# Patient Record
Sex: MALE | Race: ASIAN | Employment: OTHER | URBAN - METROPOLITAN AREA
[De-identification: names, ages, dates, MRNs, and addresses within clinical notes are randomized per-mention and may not be internally consistent; named-entity substitution may affect disease eponyms.]

---

## 2017-11-14 ENCOUNTER — HOSPITAL ENCOUNTER (OUTPATIENT)
Dept: NUCLEAR MEDICINE | Age: 77
Discharge: HOME | End: 2017-11-14
Payer: MEDICARE

## 2017-11-14 VITALS — BODY MASS INDEX: 29.18 KG/M2 | WEIGHT: 170 LBS

## 2017-11-14 DIAGNOSIS — R07.2 PRECORDIAL CHEST PAIN: ICD-10-CM

## 2017-11-14 PROCEDURE — 93017 CV STRESS TEST TRACING ONLY: CPT

## 2017-11-14 PROCEDURE — 74011250636 HC RX REV CODE- 250/636

## 2017-11-14 PROCEDURE — 78452 HT MUSCLE IMAGE SPECT MULT: CPT

## 2017-11-14 RX ORDER — SODIUM CHLORIDE 0.9 % (FLUSH) 0.9 %
10 SYRINGE (ML) INJECTION
Status: COMPLETED | OUTPATIENT
Start: 2017-11-14 | End: 2017-11-14

## 2017-11-14 RX ADMIN — Medication 10 ML: at 12:07

## 2017-11-14 RX ADMIN — REGADENOSON 0.4 MG: 0.08 INJECTION, SOLUTION INTRAVENOUS at 12:07

## 2017-11-14 RX ADMIN — Medication 10 ML: at 11:16

## 2017-11-15 NOTE — PROCEDURES
[address] STRESS TEST       Name:  Bryce Lion   MR#:  [id number]   :  1940   Account #:  [de-identified]   Date of Adm:  2017       DATE OF PROCEDURE:  2017    Rest stress myocardial perfusion scan    CLINICAL: Coronary disease, previous CABG, chest pain, shortness   of breath, 78year-old gentleman. Nuclear medicine stress test was performed. This was a [place]   study. The patient came to the echo suite fasting. Rest heart rate   63, blood pressure 156/75. The [place] was administered 400 mcg   and the peak heart rate generated 85, blood pressure was 111/56,   peak 148/79 with a stress infusion. The electrocardiogram   revealed a sinus rhythm, an old anterior infarct, nonspecific   ST-T wave changes and there was no change with the Lexiscan   infusion. No symptoms were reported. Imaging was required with   tetrofosmin 99 and Myoview 30 mCi for stress, 10 mCi for rest.     The left ventricle was imaged in short axis vertical wall,   horizontal long axis views. INTERPRETATION: There was a moderate anterior apical defect that   was primarily fixed and with some slight cherrie infarct   reperfusion. There were no significant reversible defects noted. Wall motion revealed anterior apical hypokinesis with a calculated   left ventricular ejection fraction at 46%. IMPRESSION:    1. 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 2. Mild reduction of systolic function, ejection fraction 46%.          MD Herb [place] / [place]   D:  11/15/2017   09:04   T:  11/15/2017   10:18   Job #:  [id number]

## 2018-06-28 ENCOUNTER — HOSPITAL ENCOUNTER (OUTPATIENT)
Dept: ULTRASOUND IMAGING | Age: 78
Discharge: HOME | End: 2018-06-28
Attending: INTERNAL MEDICINE
Payer: MEDICARE

## 2018-06-28 DIAGNOSIS — N18.30 CHRONIC KIDNEY DISEASE, STAGE III (MODERATE) (HCC): ICD-10-CM

## 2018-06-28 PROCEDURE — 76770 US EXAM ABDO BACK WALL COMP: CPT

## 2024-02-13 ENCOUNTER — INITIAL CONSULT (OUTPATIENT)
Age: 84
End: 2024-02-13

## 2024-02-13 VITALS — HEART RATE: 55 BPM | SYSTOLIC BLOOD PRESSURE: 102 MMHG | DIASTOLIC BLOOD PRESSURE: 58 MMHG | WEIGHT: 198.4 LBS

## 2024-02-13 DIAGNOSIS — I50.9 CONGESTIVE HEART FAILURE, UNSPECIFIED HF CHRONICITY, UNSPECIFIED HEART FAILURE TYPE (HCC): Primary | ICD-10-CM

## 2024-02-13 RX ORDER — ATORVASTATIN CALCIUM 80 MG/1
80 TABLET, FILM COATED ORAL DAILY
COMMUNITY

## 2024-02-13 RX ORDER — SACUBITRIL AND VALSARTAN 49; 51 MG/1; MG/1
1 TABLET, FILM COATED ORAL 2 TIMES DAILY
COMMUNITY

## 2024-02-13 RX ORDER — IRON, SODIUM ASCORBATE, THIAMINE MONONITRATE, RIBOFLAVIN, PYRIDOXINE HYDROCHLORIDE, FOLIC ACID, CYANOCOBALAMIN, NIACINAMIDE, CALCIUM PANTOTHENATE, ZINC SULFATE, MAGNESIUM SULFATE, MANGANESE SULFATE, AND CUPRIC SULFATE ANHYDROUS 106; 200; 10; 6; 5; 1; 15; 30; 10; 18.2; 6.9; 1.3; .8 MG/1; MG/1; MG/1; MG/1; MG/1; MG/1; UG/1; MG/1; MG/1; MG/1; MG/1; MG/1; MG/1
CAPSULE ORAL
COMMUNITY

## 2024-02-13 RX ORDER — NITROGLYCERIN 0.3 MG/1
0.3 TABLET SUBLINGUAL EVERY 5 MIN PRN
COMMUNITY

## 2024-02-13 RX ORDER — CYCLOSPORINE 0 G/ML
SOLUTION/ DROPS OPHTHALMIC; TOPICAL
COMMUNITY

## 2024-02-13 RX ORDER — SODIUM BICARBONATE 650 MG/1
650 TABLET ORAL 4 TIMES DAILY
COMMUNITY

## 2024-02-13 RX ORDER — CLOPIDOGREL BISULFATE 75 MG/1
75 TABLET ORAL DAILY
COMMUNITY

## 2024-02-13 RX ORDER — VALSARTAN 160 MG/1
160 TABLET ORAL DAILY
COMMUNITY

## 2024-02-13 RX ORDER — TERAZOSIN 5 MG/1
5 CAPSULE ORAL NIGHTLY
COMMUNITY

## 2024-02-13 RX ORDER — LEVOTHYROXINE SODIUM 88 UG/1
88 TABLET ORAL DAILY
COMMUNITY

## 2024-02-22 ASSESSMENT — ENCOUNTER SYMPTOMS
SHORTNESS OF BREATH: 0
ABDOMINAL PAIN: 0
ORTHOPNEA: 0

## 2024-02-22 NOTE — PROGRESS NOTES
furosemide (LASIX) 40 MG tablet Take 1 tablet by mouth 11/10/17  Yes Automatic Reconciliation, Ar   insulin aspart (NOVOLOG) 100 UNIT/ML injection pen Inject 22 Units into the skin   Yes Automatic Reconciliation, Ar   insulin glargine (LANTUS) 100 UNIT/ML injection vial Inject 60 Units into the skin   Yes Automatic Reconciliation, Ar   montelukast (SINGULAIR) 10 MG tablet Take 1 tablet by mouth 2/13/18  Yes Automatic Reconciliation, Ar   tamsulosin (FLOMAX) 0.4 MG capsule Take 1 capsule by mouth 3/8/18  Yes Automatic Reconciliation, Ar     Not on File  No past medical history on file.  No past surgical history on file.  No family history on file.  Social History     Tobacco Use    Smoking status: Not on file    Smokeless tobacco: Not on file   Substance Use Topics    Alcohol use: Not on file       ROS:    Review of Systems   Constitutional: Negative for chills, diaphoresis and fever.   HENT:  Negative for hearing loss.    Eyes:  Negative for visual disturbance.   Cardiovascular:  Negative for chest pain, claudication, dyspnea on exertion, leg swelling, near-syncope, orthopnea, palpitations and paroxysmal nocturnal dyspnea.   Respiratory:  Negative for shortness of breath.    Hematologic/Lymphatic: Does not bruise/bleed easily.   Gastrointestinal:  Negative for abdominal pain.   Genitourinary:  Negative for dysuria.   Neurological:  Negative for focal weakness.   Psychiatric/Behavioral:  Negative for suicidal ideas.           PHYSICAL EXAM:   BP (!) 102/58   Pulse 55   Wt 90 kg (198 lb 6.4 oz)      Physical Exam  Vitals reviewed.   Constitutional:       General: He is not in acute distress.     Appearance: Normal appearance.   HENT:      Head: Normocephalic and atraumatic.   Eyes:      General: No scleral icterus.  Neck:      Vascular: No carotid bruit.   Cardiovascular:      Rate and Rhythm: Normal rate and regular rhythm.      Heart sounds: No murmur heard.  Pulmonary:      Breath sounds: Normal breath sounds.

## 2024-03-15 ENCOUNTER — HOSPITAL ENCOUNTER (INPATIENT)
Age: 84
LOS: 4 days | Discharge: HOME HEALTH | DRG: 291 | End: 2024-03-20
Attending: GENERAL PRACTICE | Admitting: INTERNAL MEDICINE
Payer: MEDICARE

## 2024-03-15 ENCOUNTER — APPOINTMENT (OUTPATIENT)
Dept: GENERAL RADIOLOGY | Age: 84
DRG: 291 | End: 2024-03-15
Payer: MEDICARE

## 2024-03-15 DIAGNOSIS — R06.03 RESPIRATORY DISTRESS: ICD-10-CM

## 2024-03-15 DIAGNOSIS — R09.02 HYPOXIA: ICD-10-CM

## 2024-03-15 DIAGNOSIS — I50.9 ACUTE CONGESTIVE HEART FAILURE, UNSPECIFIED HEART FAILURE TYPE (HCC): Primary | ICD-10-CM

## 2024-03-15 LAB
ALBUMIN SERPL-MCNC: 3.3 G/DL (ref 3.2–4.6)
ALBUMIN/GLOB SERPL: 0.8 (ref 0.4–1.6)
ALP SERPL-CCNC: 84 U/L (ref 50–136)
ALT SERPL-CCNC: 42 U/L (ref 12–65)
ANION GAP SERPL CALC-SCNC: 4 MMOL/L (ref 2–11)
AST SERPL-CCNC: 26 U/L (ref 15–37)
BASOPHILS # BLD: 0.1 K/UL (ref 0–0.2)
BASOPHILS NFR BLD: 0 % (ref 0–2)
BILIRUB SERPL-MCNC: 0.6 MG/DL (ref 0.2–1.1)
BUN SERPL-MCNC: 35 MG/DL (ref 8–23)
CALCIUM SERPL-MCNC: 8.6 MG/DL (ref 8.3–10.4)
CHLORIDE SERPL-SCNC: 120 MMOL/L (ref 103–113)
CO2 SERPL-SCNC: 22 MMOL/L (ref 21–32)
CREAT SERPL-MCNC: 2.2 MG/DL (ref 0.8–1.5)
DIFFERENTIAL METHOD BLD: ABNORMAL
EOSINOPHIL # BLD: 0.1 K/UL (ref 0–0.8)
EOSINOPHIL NFR BLD: 1 % (ref 0.5–7.8)
ERYTHROCYTE [DISTWIDTH] IN BLOOD BY AUTOMATED COUNT: 14.3 % (ref 11.9–14.6)
GLOBULIN SER CALC-MCNC: 3.9 G/DL (ref 2.8–4.5)
GLUCOSE SERPL-MCNC: 157 MG/DL (ref 65–100)
HCT VFR BLD AUTO: 36.4 % (ref 41.1–50.3)
HGB BLD-MCNC: 11.3 G/DL (ref 13.6–17.2)
IMM GRANULOCYTES # BLD AUTO: 0 K/UL (ref 0–0.5)
IMM GRANULOCYTES NFR BLD AUTO: 0 % (ref 0–5)
LYMPHOCYTES # BLD: 3.3 K/UL (ref 0.5–4.6)
LYMPHOCYTES NFR BLD: 29 % (ref 13–44)
MAGNESIUM SERPL-MCNC: 2.1 MG/DL (ref 1.8–2.4)
MCH RBC QN AUTO: 29.4 PG (ref 26.1–32.9)
MCHC RBC AUTO-ENTMCNC: 31 G/DL (ref 31.4–35)
MCV RBC AUTO: 94.5 FL (ref 82–102)
MONOCYTES # BLD: 0.9 K/UL (ref 0.1–1.3)
MONOCYTES NFR BLD: 8 % (ref 4–12)
NEUTS SEG # BLD: 6.9 K/UL (ref 1.7–8.2)
NEUTS SEG NFR BLD: 62 % (ref 43–78)
NRBC # BLD: 0 K/UL (ref 0–0.2)
NT PRO BNP: 4531 PG/ML
PLATELET # BLD AUTO: 218 K/UL (ref 150–450)
PMV BLD AUTO: 9.8 FL (ref 9.4–12.3)
POTASSIUM SERPL-SCNC: 5.3 MMOL/L (ref 3.5–5.1)
PROT SERPL-MCNC: 7.2 G/DL (ref 6.3–8.2)
RBC # BLD AUTO: 3.85 M/UL (ref 4.23–5.6)
SODIUM SERPL-SCNC: 146 MMOL/L (ref 136–146)
WBC # BLD AUTO: 11.2 K/UL (ref 4.3–11.1)

## 2024-03-15 PROCEDURE — 83735 ASSAY OF MAGNESIUM: CPT

## 2024-03-15 PROCEDURE — 71045 X-RAY EXAM CHEST 1 VIEW: CPT

## 2024-03-15 PROCEDURE — 96374 THER/PROPH/DIAG INJ IV PUSH: CPT

## 2024-03-15 PROCEDURE — 2700000000 HC OXYGEN THERAPY PER DAY

## 2024-03-15 PROCEDURE — 93005 ELECTROCARDIOGRAM TRACING: CPT | Performed by: GENERAL PRACTICE

## 2024-03-15 PROCEDURE — 80053 COMPREHEN METABOLIC PANEL: CPT

## 2024-03-15 PROCEDURE — 6360000002 HC RX W HCPCS: Performed by: GENERAL PRACTICE

## 2024-03-15 PROCEDURE — 85025 COMPLETE CBC W/AUTO DIFF WBC: CPT

## 2024-03-15 PROCEDURE — 83880 ASSAY OF NATRIURETIC PEPTIDE: CPT

## 2024-03-15 PROCEDURE — 99285 EMERGENCY DEPT VISIT HI MDM: CPT

## 2024-03-15 RX ORDER — FUROSEMIDE 10 MG/ML
40 INJECTION INTRAMUSCULAR; INTRAVENOUS ONCE
Status: COMPLETED | OUTPATIENT
Start: 2024-03-15 | End: 2024-03-15

## 2024-03-15 RX ADMIN — FUROSEMIDE 40 MG: 10 INJECTION, SOLUTION INTRAMUSCULAR; INTRAVENOUS at 22:30

## 2024-03-15 ASSESSMENT — PAIN DESCRIPTION - LOCATION: LOCATION: ARM

## 2024-03-15 ASSESSMENT — PAIN DESCRIPTION - ORIENTATION: ORIENTATION: RIGHT

## 2024-03-15 ASSESSMENT — PAIN DESCRIPTION - DESCRIPTORS: DESCRIPTORS: ACHING

## 2024-03-15 ASSESSMENT — PAIN SCALES - GENERAL: PAINLEVEL_OUTOF10: 5

## 2024-03-16 ENCOUNTER — APPOINTMENT (OUTPATIENT)
Dept: NON INVASIVE DIAGNOSTICS | Age: 84
DRG: 291 | End: 2024-03-16
Payer: MEDICARE

## 2024-03-16 PROBLEM — D72.829 LEUKOCYTOSIS: Status: ACTIVE | Noted: 2024-03-16

## 2024-03-16 PROBLEM — J96.01 ACUTE RESPIRATORY FAILURE WITH HYPOXIA (HCC): Status: ACTIVE | Noted: 2024-03-16

## 2024-03-16 PROBLEM — I25.10 CAD (CORONARY ARTERY DISEASE): Status: ACTIVE | Noted: 2024-03-16

## 2024-03-16 PROBLEM — I50.9 ACUTE CONGESTIVE HEART FAILURE (HCC): Status: ACTIVE | Noted: 2024-03-16

## 2024-03-16 PROBLEM — Z79.4: Status: ACTIVE | Noted: 2024-03-16

## 2024-03-16 PROBLEM — I10 HYPERTENSION: Status: ACTIVE | Noted: 2024-03-16

## 2024-03-16 PROBLEM — E11.21: Status: ACTIVE | Noted: 2024-03-16

## 2024-03-16 PROBLEM — J81.0 ACUTE PULMONARY EDEMA (HCC): Status: ACTIVE | Noted: 2024-03-16

## 2024-03-16 PROBLEM — N18.30 CKD (CHRONIC KIDNEY DISEASE) STAGE 3, GFR 30-59 ML/MIN (HCC): Status: ACTIVE | Noted: 2024-03-16

## 2024-03-16 PROBLEM — I50.23 ACUTE ON CHRONIC SYSTOLIC (CONGESTIVE) HEART FAILURE (HCC): Status: ACTIVE | Noted: 2024-03-16

## 2024-03-16 LAB
APPEARANCE UR: CLEAR
ARTERIAL PATENCY WRIST A: POSITIVE
BASE DEFICIT BLD-SCNC: 5.1 MMOL/L
BDY SITE: ABNORMAL
BILIRUB UR QL: NEGATIVE
COLOR UR: NORMAL
ECHO AO ASC DIAM: 2.4 CM
ECHO AO ASCENDING AORTA INDEX: 1.26 CM/M2
ECHO AO ROOT DIAM: 2.3 CM
ECHO AO ROOT INDEX: 1.21 CM/M2
ECHO AV AREA PEAK VELOCITY: 1.6 CM2
ECHO AV AREA VTI: 1.7 CM2
ECHO AV AREA/BSA PEAK VELOCITY: 0.8 CM2/M2
ECHO AV AREA/BSA VTI: 0.9 CM2/M2
ECHO AV MEAN GRADIENT: 5 MMHG
ECHO AV MEAN VELOCITY: 1.1 M/S
ECHO AV PEAK GRADIENT: 11 MMHG
ECHO AV PEAK VELOCITY: 1.7 M/S
ECHO AV VELOCITY RATIO: 0.71
ECHO AV VTI: 31.7 CM
ECHO BSA: 1.97 M2
ECHO IVC PROX: 2 CM
ECHO LA AREA 2C: 15.7 CM2
ECHO LA AREA 4C: 13 CM2
ECHO LA DIAMETER INDEX: 2.11 CM/M2
ECHO LA DIAMETER: 4 CM
ECHO LA MAJOR AXIS: 4.8 CM
ECHO LA MINOR AXIS: 4.9 CM
ECHO LA TO AORTIC ROOT RATIO: 1.74
ECHO LA VOL BP: 35 ML (ref 18–58)
ECHO LA VOL MOD A2C: 41 ML (ref 18–58)
ECHO LA VOL MOD A4C: 29 ML (ref 18–58)
ECHO LA VOL/BSA BIPLANE: 18 ML/M2 (ref 16–34)
ECHO LA VOLUME INDEX MOD A2C: 22 ML/M2 (ref 16–34)
ECHO LA VOLUME INDEX MOD A4C: 15 ML/M2 (ref 16–34)
ECHO LV E' LATERAL VELOCITY: 5 CM/S
ECHO LV E' SEPTAL VELOCITY: 7 CM/S
ECHO LV EDV A2C: 37 ML
ECHO LV EDV A4C: 42 ML
ECHO LV EDV INDEX A4C: 22 ML/M2
ECHO LV EDV NDEX A2C: 19 ML/M2
ECHO LV EJECTION FRACTION A2C: 49 %
ECHO LV EJECTION FRACTION A4C: 51 %
ECHO LV EJECTION FRACTION BIPLANE: 50 % (ref 55–100)
ECHO LV ESV A2C: 19 ML
ECHO LV ESV A4C: 21 ML
ECHO LV ESV INDEX A2C: 10 ML/M2
ECHO LV ESV INDEX A4C: 11 ML/M2
ECHO LV FRACTIONAL SHORTENING: 25 % (ref 28–44)
ECHO LV INTERNAL DIMENSION DIASTOLE INDEX: 2.68 CM/M2
ECHO LV INTERNAL DIMENSION DIASTOLIC: 5.1 CM (ref 4.2–5.9)
ECHO LV INTERNAL DIMENSION SYSTOLIC INDEX: 2 CM/M2
ECHO LV INTERNAL DIMENSION SYSTOLIC: 3.8 CM
ECHO LV IVSD: 0.9 CM (ref 0.6–1)
ECHO LV MASS 2D: 188 G (ref 88–224)
ECHO LV MASS INDEX 2D: 99 G/M2 (ref 49–115)
ECHO LV POSTERIOR WALL DIASTOLIC: 1.1 CM (ref 0.6–1)
ECHO LV RELATIVE WALL THICKNESS RATIO: 0.43
ECHO LVOT AREA: 2.3 CM2
ECHO LVOT AV VTI INDEX: 0.74
ECHO LVOT DIAM: 1.7 CM
ECHO LVOT MEAN GRADIENT: 3 MMHG
ECHO LVOT PEAK GRADIENT: 6 MMHG
ECHO LVOT PEAK VELOCITY: 1.2 M/S
ECHO LVOT STROKE VOLUME INDEX: 28.1 ML/M2
ECHO LVOT SV: 53.3 ML
ECHO LVOT VTI: 23.5 CM
ECHO MV A VELOCITY: 0.93 M/S
ECHO MV E DECELERATION TIME (DT): 137 MS
ECHO MV E VELOCITY: 1.1 M/S
ECHO MV E/A RATIO: 1.18
ECHO MV E/E' LATERAL: 22
ECHO MV E/E' RATIO (AVERAGED): 18.86
ECHO PV MAX VELOCITY: 1.5 M/S
ECHO PV PEAK GRADIENT: 8 MMHG
ECHO RV BASAL DIMENSION: 3.5 CM
ECHO RV TAPSE: 1.8 CM (ref 1.7–?)
EKG ATRIAL RATE: 120 BPM
EKG ATRIAL RATE: 90 BPM
EKG DIAGNOSIS: NORMAL
EKG DIAGNOSIS: NORMAL
EKG P AXIS: 54 DEGREES
EKG P AXIS: 64 DEGREES
EKG P-R INTERVAL: 162 MS
EKG P-R INTERVAL: 172 MS
EKG Q-T INTERVAL: 292 MS
EKG Q-T INTERVAL: 366 MS
EKG QRS DURATION: 88 MS
EKG QRS DURATION: 90 MS
EKG QTC CALCULATION (BAZETT): 412 MS
EKG QTC CALCULATION (BAZETT): 445 MS
EKG R AXIS: 68 DEGREES
EKG R AXIS: 68 DEGREES
EKG T AXIS: 156 DEGREES
EKG T AXIS: 245 DEGREES
EKG VENTRICULAR RATE: 120 BPM
EKG VENTRICULAR RATE: 89 BPM
GAS FLOW.O2 O2 DELIVERY SYS: ABNORMAL
GLUCOSE BLD STRIP.AUTO-MCNC: 147 MG/DL (ref 65–100)
GLUCOSE BLD STRIP.AUTO-MCNC: 160 MG/DL (ref 65–100)
GLUCOSE BLD STRIP.AUTO-MCNC: 161 MG/DL (ref 65–100)
GLUCOSE BLD STRIP.AUTO-MCNC: 173 MG/DL (ref 65–100)
GLUCOSE BLD STRIP.AUTO-MCNC: 204 MG/DL (ref 65–100)
GLUCOSE UR STRIP.AUTO-MCNC: >1000 MG/DL
HCO3 BLD-SCNC: 20.7 MMOL/L (ref 22–26)
HGB UR QL STRIP: NEGATIVE
INSPIRATION.DURATION SETTING TIME VENT: 0.9 SEC
IPAP/PIP/HIGH PEEP: 20
KETONES UR QL STRIP.AUTO: NEGATIVE MG/DL
LEUKOCYTE ESTERASE UR QL STRIP.AUTO: NEGATIVE
NITRITE UR QL STRIP.AUTO: NEGATIVE
O2/TOTAL GAS SETTING VFR VENT: 40 %
PCO2 BLD: 40.2 MMHG (ref 35–45)
PEEP RESPIRATORY: 8 CMH2O
PH BLD: 7.32 (ref 7.35–7.45)
PH UR STRIP: 7 (ref 5–9)
PO2 BLD: 111 MMHG (ref 75–100)
PRESSURE SUPPORT SETTING VENT: 18 CMH2O
PROT UR STRIP-MCNC: NEGATIVE MG/DL
SAO2 % BLD: 97.9 % (ref 95–98)
SERVICE CMNT-IMP: ABNORMAL
SP GR UR REFRACTOMETRY: 1.01 (ref 1–1.02)
SPECIMEN TYPE: ABNORMAL
TROPONIN I SERPL HS-MCNC: 87.8 PG/ML (ref 0–14)
UROBILINOGEN UR QL STRIP.AUTO: 0.2 EU/DL (ref 0.2–1)
VENTILATION MODE VENT: ABNORMAL

## 2024-03-16 PROCEDURE — 2580000003 HC RX 258: Performed by: PHYSICIAN ASSISTANT

## 2024-03-16 PROCEDURE — 84484 ASSAY OF TROPONIN QUANT: CPT

## 2024-03-16 PROCEDURE — 6360000002 HC RX W HCPCS: Performed by: PHYSICIAN ASSISTANT

## 2024-03-16 PROCEDURE — 93005 ELECTROCARDIOGRAM TRACING: CPT | Performed by: PHYSICIAN ASSISTANT

## 2024-03-16 PROCEDURE — 2000000000 HC ICU R&B

## 2024-03-16 PROCEDURE — 36600 WITHDRAWAL OF ARTERIAL BLOOD: CPT

## 2024-03-16 PROCEDURE — 6370000000 HC RX 637 (ALT 250 FOR IP): Performed by: PHYSICIAN ASSISTANT

## 2024-03-16 PROCEDURE — 2700000000 HC OXYGEN THERAPY PER DAY

## 2024-03-16 PROCEDURE — C8929 TTE W OR WO FOL WCON,DOPPLER: HCPCS

## 2024-03-16 PROCEDURE — 5A09357 ASSISTANCE WITH RESPIRATORY VENTILATION, LESS THAN 24 CONSECUTIVE HOURS, CONTINUOUS POSITIVE AIRWAY PRESSURE: ICD-10-PCS | Performed by: GENERAL PRACTICE

## 2024-03-16 PROCEDURE — 99291 CRITICAL CARE FIRST HOUR: CPT | Performed by: INTERNAL MEDICINE

## 2024-03-16 PROCEDURE — 2580000003 HC RX 258: Performed by: INTERNAL MEDICINE

## 2024-03-16 PROCEDURE — 81003 URINALYSIS AUTO W/O SCOPE: CPT

## 2024-03-16 PROCEDURE — 82962 GLUCOSE BLOOD TEST: CPT

## 2024-03-16 PROCEDURE — 51798 US URINE CAPACITY MEASURE: CPT

## 2024-03-16 PROCEDURE — 87088 URINE BACTERIA CULTURE: CPT

## 2024-03-16 PROCEDURE — 94660 CPAP INITIATION&MGMT: CPT

## 2024-03-16 PROCEDURE — 82803 BLOOD GASES ANY COMBINATION: CPT

## 2024-03-16 PROCEDURE — 93010 ELECTROCARDIOGRAM REPORT: CPT | Performed by: INTERNAL MEDICINE

## 2024-03-16 PROCEDURE — 87086 URINE CULTURE/COLONY COUNT: CPT

## 2024-03-16 PROCEDURE — 99223 1ST HOSP IP/OBS HIGH 75: CPT | Performed by: INTERNAL MEDICINE

## 2024-03-16 PROCEDURE — 93306 TTE W/DOPPLER COMPLETE: CPT | Performed by: INTERNAL MEDICINE

## 2024-03-16 PROCEDURE — 87186 SC STD MICRODIL/AGAR DIL: CPT

## 2024-03-16 PROCEDURE — 51702 INSERT TEMP BLADDER CATH: CPT

## 2024-03-16 PROCEDURE — 6360000004 HC RX CONTRAST MEDICATION: Performed by: INTERNAL MEDICINE

## 2024-03-16 RX ORDER — FUROSEMIDE 10 MG/ML
40 INJECTION INTRAMUSCULAR; INTRAVENOUS ONCE
Status: COMPLETED | OUTPATIENT
Start: 2024-03-16 | End: 2024-03-16

## 2024-03-16 RX ORDER — ONDANSETRON 2 MG/ML
4 INJECTION INTRAMUSCULAR; INTRAVENOUS EVERY 6 HOURS PRN
Status: DISCONTINUED | OUTPATIENT
Start: 2024-03-16 | End: 2024-03-20 | Stop reason: HOSPADM

## 2024-03-16 RX ORDER — SODIUM CHLORIDE 9 MG/ML
INJECTION, SOLUTION INTRAVENOUS PRN
Status: DISCONTINUED | OUTPATIENT
Start: 2024-03-16 | End: 2024-03-20 | Stop reason: HOSPADM

## 2024-03-16 RX ORDER — DOXAZOSIN MESYLATE 4 MG/1
4 TABLET ORAL DAILY
Status: DISCONTINUED | OUTPATIENT
Start: 2024-03-16 | End: 2024-03-20 | Stop reason: HOSPADM

## 2024-03-16 RX ORDER — ONDANSETRON 4 MG/1
4 TABLET, ORALLY DISINTEGRATING ORAL EVERY 8 HOURS PRN
Status: DISCONTINUED | OUTPATIENT
Start: 2024-03-16 | End: 2024-03-20 | Stop reason: HOSPADM

## 2024-03-16 RX ORDER — SODIUM CHLORIDE 0.9 % (FLUSH) 0.9 %
5-40 SYRINGE (ML) INJECTION EVERY 12 HOURS SCHEDULED
Status: DISCONTINUED | OUTPATIENT
Start: 2024-03-16 | End: 2024-03-20 | Stop reason: HOSPADM

## 2024-03-16 RX ORDER — INSULIN LISPRO 100 [IU]/ML
0-4 INJECTION, SOLUTION INTRAVENOUS; SUBCUTANEOUS NIGHTLY
Status: DISCONTINUED | OUTPATIENT
Start: 2024-03-16 | End: 2024-03-20 | Stop reason: HOSPADM

## 2024-03-16 RX ORDER — TAMSULOSIN HYDROCHLORIDE 0.4 MG/1
0.4 CAPSULE ORAL DAILY
Status: DISCONTINUED | OUTPATIENT
Start: 2024-03-16 | End: 2024-03-20 | Stop reason: HOSPADM

## 2024-03-16 RX ORDER — NITROGLYCERIN 20 MG/100ML
5-200 INJECTION INTRAVENOUS CONTINUOUS
Status: DISCONTINUED | OUTPATIENT
Start: 2024-03-16 | End: 2024-03-20 | Stop reason: HOSPADM

## 2024-03-16 RX ORDER — INSULIN LISPRO 100 [IU]/ML
0-4 INJECTION, SOLUTION INTRAVENOUS; SUBCUTANEOUS
Status: DISCONTINUED | OUTPATIENT
Start: 2024-03-16 | End: 2024-03-20 | Stop reason: HOSPADM

## 2024-03-16 RX ORDER — POLYETHYLENE GLYCOL 3350 17 G/17G
17 POWDER, FOR SOLUTION ORAL DAILY PRN
Status: DISCONTINUED | OUTPATIENT
Start: 2024-03-16 | End: 2024-03-20 | Stop reason: HOSPADM

## 2024-03-16 RX ORDER — LEVOTHYROXINE SODIUM 88 UG/1
88 TABLET ORAL DAILY
Status: DISCONTINUED | OUTPATIENT
Start: 2024-03-16 | End: 2024-03-20 | Stop reason: HOSPADM

## 2024-03-16 RX ORDER — ALOGLIPTIN 6.25 MG/1
6.25 TABLET, FILM COATED ORAL DAILY
Status: DISCONTINUED | OUTPATIENT
Start: 2024-03-16 | End: 2024-03-20 | Stop reason: HOSPADM

## 2024-03-16 RX ORDER — ALBUTEROL SULFATE 2.5 MG/3ML
2.5 SOLUTION RESPIRATORY (INHALATION) EVERY 6 HOURS PRN
Status: DISCONTINUED | OUTPATIENT
Start: 2024-03-16 | End: 2024-03-20 | Stop reason: HOSPADM

## 2024-03-16 RX ORDER — FERROUS SULFATE 325(65) MG
325 TABLET ORAL 2 TIMES DAILY
COMMUNITY

## 2024-03-16 RX ORDER — ENOXAPARIN SODIUM 100 MG/ML
30 INJECTION SUBCUTANEOUS DAILY
Status: DISCONTINUED | OUTPATIENT
Start: 2024-03-16 | End: 2024-03-20 | Stop reason: HOSPADM

## 2024-03-16 RX ORDER — FUROSEMIDE 10 MG/ML
40 INJECTION INTRAMUSCULAR; INTRAVENOUS 2 TIMES DAILY
Status: DISCONTINUED | OUTPATIENT
Start: 2024-03-16 | End: 2024-03-16

## 2024-03-16 RX ORDER — SODIUM BICARBONATE 650 MG/1
650 TABLET ORAL 4 TIMES DAILY
Status: DISCONTINUED | OUTPATIENT
Start: 2024-03-16 | End: 2024-03-20 | Stop reason: HOSPADM

## 2024-03-16 RX ORDER — INSULIN GLARGINE 100 [IU]/ML
60 INJECTION, SOLUTION SUBCUTANEOUS NIGHTLY
Status: DISCONTINUED | OUTPATIENT
Start: 2024-03-16 | End: 2024-03-20 | Stop reason: HOSPADM

## 2024-03-16 RX ORDER — ACETAMINOPHEN 325 MG/1
650 TABLET ORAL EVERY 6 HOURS PRN
Status: DISCONTINUED | OUTPATIENT
Start: 2024-03-16 | End: 2024-03-20 | Stop reason: HOSPADM

## 2024-03-16 RX ORDER — CLOPIDOGREL BISULFATE 75 MG/1
75 TABLET ORAL DAILY
Status: DISCONTINUED | OUTPATIENT
Start: 2024-03-16 | End: 2024-03-20 | Stop reason: HOSPADM

## 2024-03-16 RX ORDER — MORPHINE SULFATE 2 MG/ML
2 INJECTION, SOLUTION INTRAMUSCULAR; INTRAVENOUS ONCE
Status: COMPLETED | OUTPATIENT
Start: 2024-03-16 | End: 2024-03-16

## 2024-03-16 RX ORDER — NITROGLYCERIN 0.4 MG/1
0.4 TABLET SUBLINGUAL EVERY 5 MIN PRN
Status: DISCONTINUED | OUTPATIENT
Start: 2024-03-16 | End: 2024-03-20 | Stop reason: HOSPADM

## 2024-03-16 RX ORDER — ATORVASTATIN CALCIUM 80 MG/1
80 TABLET, FILM COATED ORAL DAILY
Status: DISCONTINUED | OUTPATIENT
Start: 2024-03-16 | End: 2024-03-20 | Stop reason: HOSPADM

## 2024-03-16 RX ORDER — SODIUM CHLORIDE 0.9 % (FLUSH) 0.9 %
5-40 SYRINGE (ML) INJECTION PRN
Status: DISCONTINUED | OUTPATIENT
Start: 2024-03-16 | End: 2024-03-20 | Stop reason: HOSPADM

## 2024-03-16 RX ORDER — MONTELUKAST SODIUM 10 MG/1
10 TABLET ORAL NIGHTLY
Status: DISCONTINUED | OUTPATIENT
Start: 2024-03-16 | End: 2024-03-20 | Stop reason: HOSPADM

## 2024-03-16 RX ADMIN — SODIUM CHLORIDE, PRESERVATIVE FREE 10 ML: 5 INJECTION INTRAVENOUS at 19:45

## 2024-03-16 RX ADMIN — FUROSEMIDE 10 MG/HR: 10 INJECTION, SOLUTION INTRAMUSCULAR; INTRAVENOUS at 06:24

## 2024-03-16 RX ADMIN — INSULIN LISPRO 1 UNITS: 100 INJECTION, SOLUTION INTRAVENOUS; SUBCUTANEOUS at 09:57

## 2024-03-16 RX ADMIN — EMPAGLIFLOZIN 10 MG: 10 TABLET, FILM COATED ORAL at 10:46

## 2024-03-16 RX ADMIN — NITROGLYCERIN 1 INCH: 20 OINTMENT TOPICAL at 05:28

## 2024-03-16 RX ADMIN — FUROSEMIDE 10 MG/HR: 10 INJECTION, SOLUTION INTRAMUSCULAR; INTRAVENOUS at 16:35

## 2024-03-16 RX ADMIN — ATORVASTATIN CALCIUM 80 MG: 80 TABLET, FILM COATED ORAL at 09:42

## 2024-03-16 RX ADMIN — SODIUM BICARBONATE 650 MG TABLET 650 MG: at 21:39

## 2024-03-16 RX ADMIN — METOPROLOL TARTRATE 25 MG: 25 TABLET, FILM COATED ORAL at 21:41

## 2024-03-16 RX ADMIN — FUROSEMIDE 40 MG: 10 INJECTION, SOLUTION INTRAMUSCULAR; INTRAVENOUS at 05:14

## 2024-03-16 RX ADMIN — DOXAZOSIN 4 MG: 4 TABLET ORAL at 10:46

## 2024-03-16 RX ADMIN — ENOXAPARIN SODIUM 30 MG: 100 INJECTION SUBCUTANEOUS at 09:42

## 2024-03-16 RX ADMIN — MORPHINE SULFATE 2 MG: 2 INJECTION, SOLUTION INTRAMUSCULAR; INTRAVENOUS at 05:29

## 2024-03-16 RX ADMIN — SACUBITRIL AND VALSARTAN 1 TABLET: 49; 51 TABLET, FILM COATED ORAL at 10:46

## 2024-03-16 RX ADMIN — NITROGLYCERIN 20 MCG/MIN: 20 INJECTION INTRAVENOUS at 06:17

## 2024-03-16 RX ADMIN — LEVOTHYROXINE SODIUM 88 MCG: 0.09 TABLET ORAL at 10:04

## 2024-03-16 RX ADMIN — METOPROLOL TARTRATE 25 MG: 25 TABLET, FILM COATED ORAL at 09:56

## 2024-03-16 RX ADMIN — SODIUM BICARBONATE 650 MG TABLET 650 MG: at 18:17

## 2024-03-16 RX ADMIN — SODIUM CHLORIDE, PRESERVATIVE FREE 10 ML: 5 INJECTION INTRAVENOUS at 09:00

## 2024-03-16 RX ADMIN — SODIUM BICARBONATE 650 MG TABLET 650 MG: at 09:42

## 2024-03-16 RX ADMIN — INSULIN GLARGINE 60 UNITS: 100 INJECTION, SOLUTION SUBCUTANEOUS at 21:30

## 2024-03-16 RX ADMIN — TAMSULOSIN HYDROCHLORIDE 0.4 MG: 0.4 CAPSULE ORAL at 10:46

## 2024-03-16 RX ADMIN — SACUBITRIL AND VALSARTAN 1 TABLET: 49; 51 TABLET, FILM COATED ORAL at 21:41

## 2024-03-16 RX ADMIN — MONTELUKAST 10 MG: 10 TABLET, FILM COATED ORAL at 21:40

## 2024-03-16 RX ADMIN — SODIUM CHLORIDE, PRESERVATIVE FREE 0.6 ML: 5 INJECTION INTRAVENOUS at 08:35

## 2024-03-16 RX ADMIN — SODIUM BICARBONATE 650 MG TABLET 650 MG: at 13:30

## 2024-03-16 RX ADMIN — CLOPIDOGREL BISULFATE 75 MG: 75 TABLET ORAL at 09:42

## 2024-03-16 ASSESSMENT — PAIN SCALES - GENERAL
PAINLEVEL_OUTOF10: 0
PAINLEVEL_OUTOF10: 4
PAINLEVEL_OUTOF10: 0
PAINLEVEL_OUTOF10: 9

## 2024-03-16 ASSESSMENT — PAIN DESCRIPTION - LOCATION: LOCATION: CHEST

## 2024-03-16 ASSESSMENT — PAIN SCALES - WONG BAKER: WONGBAKER_NUMERICALRESPONSE: HURTS LITTLE MORE

## 2024-03-16 NOTE — ED NOTES
TRANSFER - OUT REPORT:    Verbal report given to Avril GRANADOS on Conner Miguel  being transferred to [facility] for routine progression of patient care       Report consisted of patient's Situation, Background, Assessment and   Recommendations(SBAR).     Information from the following report(s) Nurse Handoff Report was reviewed with the receiving nurse.    [place] Fall Assessment:    Presents to emergency department  because of falls (Syncope, seizure, or loss of consciousness): No  Age > 70: Yes  Altered Mental Status, Intoxication with alcohol or substance confusion (Disorientation, impaired judgment, poor safety awaremess, or inability to follow instructions): No  Impaired Mobility: Ambulates or transfers with assistive devices or assistance; Unable to ambulate or transer.: Yes  Nursing Judgement: No          Lines:   Peripheral IV 03/15/24 Distal;Posterior;Right Wrist (Active)   Site Assessment Clean, dry & intact 03/15/24 2145   Line Status Normal saline locked;Flushed 03/15/24 2145   Line Care Connections checked and tightened 03/15/24 2145   Phlebitis Assessment No symptoms 03/15/24 2145   Infiltration Assessment 0 03/15/24 2145   Dressing Status Clean, dry & intact;New dressing applied 03/15/24 2145        Opportunity for questions and clarification was provided.      Patient transported with:  Monitor           Keon Posadas RN  03/16/24 6218

## 2024-03-16 NOTE — ED PROVIDER NOTES
Emergency Department Provider Note       PCP: Nick Smith MD   Age: 83 y.o.   Sex: male     DISPOSITION Decision To Admit 03/16/2024 12:22:30 AM       ICD-10-CM    1. Acute congestive heart failure, unspecified heart failure type (HCC)  I50.9       2. Respiratory distress  R06.03       3. Hypoxia  R09.02           Medical Decision Making     Patient presents with acute respiratory distress and hypoxia.  He is known to have CHF.  Chest x-ray is consistent with interstitial edema and BNP is elevated over 4000.  It does not appear to be pneumonia.  Patient was having significant distress and I placed him on BiPAP.  He is starting to diurese and my hope is that he will continue to diurese and be able to be weaned off BiPAP.  I doubt PE.  Especially with the findings on the chest x-ray.  I have discussed the case with [facility] cardiology.  They have agreed to admit the patient.  Troponin is pending.  However, patient is not having any significant chest pain.     1 or more acute illnesses that pose a threat to life or bodily function.   1 or more chronic illnesses with a severe exacerbation or progression.  Drug therapy given requiring intensive monitoring for toxicity.  Discussion with external consultants.  Chronic medical problems impacting care include congestive heart failure.  Shared medical decision making was utilized in creating the patients health plan today.    I independently ordered and reviewed each unique test.  I reviewed external records: provider visit note from outside specialist.  I reviewed external records: previous lab results from outside ED.  I reviewed external records: previous imaging study including radiologist interpretation.     I interpreted the X-rays chest x-ray shows diffuse interstitial edema and cardiomegaly.  No obvious infiltrate.  I have reviewed and agree with radiology report.  My Independent EKG Interpretation: sinus rhythm, no evidence of arrhythmia      ST

## 2024-03-16 NOTE — ED TRIAGE NOTES
Pt c/o of R arm pain and SHOB. Pt english limited. Family reports that he was dx with heart failure and put on lasix. Family reports pt c/o of diarrhea and well and generalized weakness.

## 2024-03-16 NOTE — ED NOTES
Patients oxygen level at 88%. 2l of oxygen applied and oxygen allison to 92%. Dr. Agee notified.      Keon Posadas, DARWIN  03/15/24 3809

## 2024-03-16 NOTE — H&P
[facility] CARDIOLOGY History &Physical                 Primary Cardiologist: [facility] recently changed to Dr Triplett    Primary Care Physician: Nick Smith MD    Admitting Physician: Dr Lee    Subjective:     Patient is a 83 y.o. male who presents with SOB. He has a ho CKD, htn, DM, CAD s/p CABG ([place] 2004) and PCI (2009 - PCI to L circ, LIMA to ALD patent, RCA small distal lesion),  glaucoma, hld, and sHF w echo 10/2017 w EF 40-45% w decreased RV function and grade 2 DD.  Pt has recently seen Dr Triplett to establish care. This is his second flare of CHF this year, he had seen his PCP a few weeks ago and was treated w doxycylne, prednisone x 5 days and increased lasix.  He initially did slightly better but tonight \"could not breath\".  Pt's primary language is not English but DIL at the bedside requested to serve as interpretor and is a helpful historian.  He has had a dry cough, increased LE edema, weight gain, no CP, dizziness, syncope, F/C or sore throat.  He has been compliant w meds and low NA diet.  He was given IV lasix in ER and feels slightly better.  O2 sats dropped and placed on BIPAP. Cr 2.2, pBNP 4500, WBC 11, hgb 11, CxR possible early CHF.  He is having 3 loose stools a day and dysuria.     Past Medical History:   Diagnosis Date    CHF (congestive heart failure) (HCC)     Diabetes mellitus (HCC)       Past Surgical History:   Procedure Laterality Date    CORONARY ANGIOPLASTY WITH STENT PLACEMENT      2    CORONARY ARTERY BYPASS GRAFT        No Known Allergies  Social History     Tobacco Use    Smoking status: Never    Smokeless tobacco: Never   Substance Use Topics    Alcohol use: Not Currently      FH: History reviewed. No pertinent family history.     Review of Systems  General: + weight gain, + weakness, fever or chills  Skin: no rashes, lumps, or other skin changes  HEENT: no headache, dizziness, lightheadedness, vision changes, hearing changes, tinnitus, vertigo, sinus pressure/pain, bleeding

## 2024-03-17 LAB
ANION GAP SERPL CALC-SCNC: 5 MMOL/L (ref 2–11)
BUN SERPL-MCNC: 43 MG/DL (ref 8–23)
CALCIUM SERPL-MCNC: 8.9 MG/DL (ref 8.3–10.4)
CHLORIDE SERPL-SCNC: 106 MMOL/L (ref 103–113)
CO2 SERPL-SCNC: 28 MMOL/L (ref 21–32)
CREAT SERPL-MCNC: 2.2 MG/DL (ref 0.8–1.5)
GLUCOSE BLD STRIP.AUTO-MCNC: 155 MG/DL (ref 65–100)
GLUCOSE BLD STRIP.AUTO-MCNC: 171 MG/DL (ref 65–100)
GLUCOSE BLD STRIP.AUTO-MCNC: 215 MG/DL (ref 65–100)
GLUCOSE BLD STRIP.AUTO-MCNC: 59 MG/DL (ref 65–100)
GLUCOSE BLD STRIP.AUTO-MCNC: 73 MG/DL (ref 65–100)
GLUCOSE SERPL-MCNC: 78 MG/DL (ref 65–100)
MAGNESIUM SERPL-MCNC: 1.8 MG/DL (ref 1.8–2.4)
POTASSIUM SERPL-SCNC: 4.1 MMOL/L (ref 3.5–5.1)
SERVICE CMNT-IMP: ABNORMAL
SERVICE CMNT-IMP: NORMAL
SODIUM SERPL-SCNC: 139 MMOL/L (ref 136–146)

## 2024-03-17 PROCEDURE — 36415 COLL VENOUS BLD VENIPUNCTURE: CPT

## 2024-03-17 PROCEDURE — 83735 ASSAY OF MAGNESIUM: CPT

## 2024-03-17 PROCEDURE — 2580000003 HC RX 258: Performed by: PHYSICIAN ASSISTANT

## 2024-03-17 PROCEDURE — 6370000000 HC RX 637 (ALT 250 FOR IP)

## 2024-03-17 PROCEDURE — 80048 BASIC METABOLIC PNL TOTAL CA: CPT

## 2024-03-17 PROCEDURE — 99232 SBSQ HOSP IP/OBS MODERATE 35: CPT | Performed by: INTERNAL MEDICINE

## 2024-03-17 PROCEDURE — 6360000002 HC RX W HCPCS: Performed by: PHYSICIAN ASSISTANT

## 2024-03-17 PROCEDURE — 2140000000 HC CCU INTERMEDIATE R&B

## 2024-03-17 PROCEDURE — 99291 CRITICAL CARE FIRST HOUR: CPT | Performed by: INTERNAL MEDICINE

## 2024-03-17 PROCEDURE — 82962 GLUCOSE BLOOD TEST: CPT

## 2024-03-17 PROCEDURE — 6370000000 HC RX 637 (ALT 250 FOR IP): Performed by: PHYSICIAN ASSISTANT

## 2024-03-17 RX ORDER — TETRAHYDROZOLINE HCL 0.05 %
1 DROPS OPHTHALMIC (EYE) EVERY 4 HOURS PRN
Status: DISCONTINUED | OUTPATIENT
Start: 2024-03-17 | End: 2024-03-20 | Stop reason: HOSPADM

## 2024-03-17 RX ADMIN — FUROSEMIDE 10 MG/HR: 10 INJECTION, SOLUTION INTRAMUSCULAR; INTRAVENOUS at 03:42

## 2024-03-17 RX ADMIN — SACUBITRIL AND VALSARTAN 1 TABLET: 49; 51 TABLET, FILM COATED ORAL at 20:12

## 2024-03-17 RX ADMIN — MONTELUKAST 10 MG: 10 TABLET, FILM COATED ORAL at 20:13

## 2024-03-17 RX ADMIN — ATORVASTATIN CALCIUM 80 MG: 80 TABLET, FILM COATED ORAL at 08:37

## 2024-03-17 RX ADMIN — SODIUM BICARBONATE 650 MG TABLET 650 MG: at 17:01

## 2024-03-17 RX ADMIN — ENOXAPARIN SODIUM 30 MG: 100 INJECTION SUBCUTANEOUS at 08:37

## 2024-03-17 RX ADMIN — SODIUM BICARBONATE 650 MG TABLET 650 MG: at 08:37

## 2024-03-17 RX ADMIN — METOPROLOL TARTRATE 25 MG: 25 TABLET, FILM COATED ORAL at 10:13

## 2024-03-17 RX ADMIN — SODIUM CHLORIDE, PRESERVATIVE FREE 10 ML: 5 INJECTION INTRAVENOUS at 08:37

## 2024-03-17 RX ADMIN — CLOPIDOGREL BISULFATE 75 MG: 75 TABLET ORAL at 08:38

## 2024-03-17 RX ADMIN — SODIUM BICARBONATE 650 MG TABLET 650 MG: at 12:04

## 2024-03-17 RX ADMIN — LEVOTHYROXINE SODIUM 88 MCG: 0.09 TABLET ORAL at 05:53

## 2024-03-17 RX ADMIN — ALOGLIPTIN 6.25 MG: 6.25 TABLET, FILM COATED ORAL at 08:38

## 2024-03-17 RX ADMIN — METOPROLOL TARTRATE 25 MG: 25 TABLET, FILM COATED ORAL at 20:12

## 2024-03-17 RX ADMIN — DOXAZOSIN 4 MG: 4 TABLET ORAL at 08:38

## 2024-03-17 RX ADMIN — EMPAGLIFLOZIN 10 MG: 10 TABLET, FILM COATED ORAL at 08:38

## 2024-03-17 RX ADMIN — INSULIN GLARGINE 60 UNITS: 100 INJECTION, SOLUTION SUBCUTANEOUS at 21:30

## 2024-03-17 RX ADMIN — SODIUM BICARBONATE 650 MG TABLET 650 MG: at 20:12

## 2024-03-17 RX ADMIN — Medication 1 DROP: at 08:36

## 2024-03-17 RX ADMIN — SACUBITRIL AND VALSARTAN 1 TABLET: 49; 51 TABLET, FILM COATED ORAL at 08:38

## 2024-03-17 RX ADMIN — TAMSULOSIN HYDROCHLORIDE 0.4 MG: 0.4 CAPSULE ORAL at 08:39

## 2024-03-17 RX ADMIN — SODIUM CHLORIDE, PRESERVATIVE FREE 10 ML: 5 INJECTION INTRAVENOUS at 20:14

## 2024-03-17 RX ADMIN — Medication 1 DROP: at 21:35

## 2024-03-17 ASSESSMENT — PAIN SCALES - GENERAL
PAINLEVEL_OUTOF10: 0

## 2024-03-18 LAB
ANION GAP SERPL CALC-SCNC: 9 MMOL/L (ref 2–11)
BUN SERPL-MCNC: 52 MG/DL (ref 8–23)
CALCIUM SERPL-MCNC: 9.1 MG/DL (ref 8.3–10.4)
CHLORIDE SERPL-SCNC: 102 MMOL/L (ref 103–113)
CO2 SERPL-SCNC: 29 MMOL/L (ref 21–32)
CREAT SERPL-MCNC: 2.4 MG/DL (ref 0.8–1.5)
GLUCOSE BLD STRIP.AUTO-MCNC: 154 MG/DL (ref 65–100)
GLUCOSE BLD STRIP.AUTO-MCNC: 161 MG/DL (ref 65–100)
GLUCOSE BLD STRIP.AUTO-MCNC: 287 MG/DL (ref 65–100)
GLUCOSE BLD STRIP.AUTO-MCNC: 75 MG/DL (ref 65–100)
GLUCOSE SERPL-MCNC: 51 MG/DL (ref 65–100)
MAGNESIUM SERPL-MCNC: 1.9 MG/DL (ref 1.8–2.4)
POTASSIUM SERPL-SCNC: 3.6 MMOL/L (ref 3.5–5.1)
SERVICE CMNT-IMP: ABNORMAL
SERVICE CMNT-IMP: NORMAL
SODIUM SERPL-SCNC: 140 MMOL/L (ref 136–146)

## 2024-03-18 PROCEDURE — 80048 BASIC METABOLIC PNL TOTAL CA: CPT

## 2024-03-18 PROCEDURE — 97161 PT EVAL LOW COMPLEX 20 MIN: CPT

## 2024-03-18 PROCEDURE — 6370000000 HC RX 637 (ALT 250 FOR IP): Performed by: INTERNAL MEDICINE

## 2024-03-18 PROCEDURE — 97530 THERAPEUTIC ACTIVITIES: CPT

## 2024-03-18 PROCEDURE — 6360000002 HC RX W HCPCS: Performed by: PHYSICIAN ASSISTANT

## 2024-03-18 PROCEDURE — 83735 ASSAY OF MAGNESIUM: CPT

## 2024-03-18 PROCEDURE — 2140000000 HC CCU INTERMEDIATE R&B

## 2024-03-18 PROCEDURE — 2580000003 HC RX 258: Performed by: PHYSICIAN ASSISTANT

## 2024-03-18 PROCEDURE — 82962 GLUCOSE BLOOD TEST: CPT

## 2024-03-18 PROCEDURE — 36415 COLL VENOUS BLD VENIPUNCTURE: CPT

## 2024-03-18 PROCEDURE — 6370000000 HC RX 637 (ALT 250 FOR IP): Performed by: PHYSICIAN ASSISTANT

## 2024-03-18 PROCEDURE — 99232 SBSQ HOSP IP/OBS MODERATE 35: CPT | Performed by: INTERNAL MEDICINE

## 2024-03-18 RX ORDER — FUROSEMIDE 40 MG/1
40 TABLET ORAL 2 TIMES DAILY
Status: DISCONTINUED | OUTPATIENT
Start: 2024-03-18 | End: 2024-03-20 | Stop reason: HOSPADM

## 2024-03-18 RX ORDER — IBUPROFEN 600 MG/1
1 TABLET ORAL PRN
Status: DISCONTINUED | OUTPATIENT
Start: 2024-03-18 | End: 2024-03-20 | Stop reason: HOSPADM

## 2024-03-18 RX ORDER — DEXTROSE MONOHYDRATE 100 MG/ML
INJECTION, SOLUTION INTRAVENOUS CONTINUOUS PRN
Status: DISCONTINUED | OUTPATIENT
Start: 2024-03-18 | End: 2024-03-20 | Stop reason: HOSPADM

## 2024-03-18 RX ADMIN — SODIUM BICARBONATE 650 MG TABLET 650 MG: at 09:03

## 2024-03-18 RX ADMIN — FUROSEMIDE 40 MG: 40 TABLET ORAL at 18:03

## 2024-03-18 RX ADMIN — DOXAZOSIN 4 MG: 4 TABLET ORAL at 09:02

## 2024-03-18 RX ADMIN — METOPROLOL TARTRATE 25 MG: 25 TABLET, FILM COATED ORAL at 09:03

## 2024-03-18 RX ADMIN — SODIUM BICARBONATE 650 MG TABLET 650 MG: at 18:02

## 2024-03-18 RX ADMIN — INSULIN GLARGINE 60 UNITS: 100 INJECTION, SOLUTION SUBCUTANEOUS at 21:03

## 2024-03-18 RX ADMIN — ATORVASTATIN CALCIUM 80 MG: 80 TABLET, FILM COATED ORAL at 09:03

## 2024-03-18 RX ADMIN — MONTELUKAST 10 MG: 10 TABLET, FILM COATED ORAL at 21:02

## 2024-03-18 RX ADMIN — SODIUM BICARBONATE 650 MG TABLET 650 MG: at 11:55

## 2024-03-18 RX ADMIN — TAMSULOSIN HYDROCHLORIDE 0.4 MG: 0.4 CAPSULE ORAL at 09:02

## 2024-03-18 RX ADMIN — SACUBITRIL AND VALSARTAN 1 TABLET: 49; 51 TABLET, FILM COATED ORAL at 09:02

## 2024-03-18 RX ADMIN — ENOXAPARIN SODIUM 30 MG: 100 INJECTION SUBCUTANEOUS at 09:03

## 2024-03-18 RX ADMIN — SODIUM BICARBONATE 650 MG TABLET 650 MG: at 21:01

## 2024-03-18 RX ADMIN — ALOGLIPTIN 6.25 MG: 6.25 TABLET, FILM COATED ORAL at 09:02

## 2024-03-18 RX ADMIN — CLOPIDOGREL BISULFATE 75 MG: 75 TABLET ORAL at 09:03

## 2024-03-18 RX ADMIN — SACUBITRIL AND VALSARTAN 1 TABLET: 49; 51 TABLET, FILM COATED ORAL at 21:02

## 2024-03-18 RX ADMIN — FUROSEMIDE 10 MG/HR: 10 INJECTION, SOLUTION INTRAMUSCULAR; INTRAVENOUS at 04:08

## 2024-03-18 RX ADMIN — LEVOTHYROXINE SODIUM 88 MCG: 0.09 TABLET ORAL at 04:08

## 2024-03-18 RX ADMIN — SODIUM CHLORIDE, PRESERVATIVE FREE 10 ML: 5 INJECTION INTRAVENOUS at 21:04

## 2024-03-18 RX ADMIN — INSULIN LISPRO 2 UNITS: 100 INJECTION, SOLUTION INTRAVENOUS; SUBCUTANEOUS at 11:55

## 2024-03-18 RX ADMIN — EMPAGLIFLOZIN 10 MG: 10 TABLET, FILM COATED ORAL at 09:02

## 2024-03-18 ASSESSMENT — PAIN SCALES - GENERAL
PAINLEVEL_OUTOF10: 0
PAINLEVEL_OUTOF10: 0

## 2024-03-18 NOTE — PLAN OF CARE
Problem: Discharge Planning  Goal: Discharge to home or other facility with appropriate resources  3/18/2024 0132 by Apurva Davidson RN  Outcome: Progressing  Flowsheets  Taken 3/17/2024 2306 by Apurva Davidson RN  Discharge to home or other facility with appropriate resources: Identify barriers to discharge with patient and caregiver  Taken 3/17/2024 1920 by Latoya Roegl RN  Discharge to home or other facility with appropriate resources: Identify barriers to discharge with patient and caregiver     Problem: Pain  Goal: Verbalizes/displays adequate comfort level or baseline comfort level  3/18/2024 0132 by Apurva Davidson RN  Outcome: Progressing  Flowsheets  Taken 3/17/2024 2306 by Apurva Davidson RN  Verbalizes/displays adequate comfort level or baseline comfort level: Encourage patient to monitor pain and request assistance  Taken 3/17/2024 1920 by Latoya Rogel RN  Verbalizes/displays adequate comfort level or baseline comfort level: Assess pain using appropriate pain scale     Problem: Safety - Adult  Goal: Free from fall injury  3/18/2024 0132 by Apurva Davidson RN  Outcome: Progressing  Flowsheets (Taken 3/17/2024 2306)  Free From Fall Injury: Instruct family/caregiver on patient safety     Problem: Chronic Conditions and Co-morbidities  Goal: Patient's chronic conditions and co-morbidity symptoms are monitored and maintained or improved  3/18/2024 0133 by Apurva Davidson RN  Outcome: Progressing     Problem: Skin/Tissue Integrity  Goal: Absence of new skin breakdown  Description: 1.  Monitor for areas of redness and/or skin breakdown  2.  Assess vascular access sites hourly  3.  Every 4-6 hours minimum:  Change oxygen saturation probe site  4.  Every 4-6 hours:  If on nasal continuous positive airway pressure, respiratory therapy assess nares and determine need for appliance change or resting period.  3/18/2024 0133 by Apurva Davidson RN  Outcome: Progressing

## 2024-03-18 NOTE — CARE COORDINATION
Patient admitted with acute on chronic heart failure. On room air. Furosemide drip.   PT/OT to evaluate.   CM met with patient and his daughter, Esvin, to discuss transition of care. Patient is quiet but alert and oriented X 4. Verified PCP, demographic, and insurance. Lives alone in a single level home. DaughterEsvin, lives a mile away from patient. No DME in use. No supportive care prior to admission.   CM ordered PT/OT to evaluated.   CM will follow status and therapy recommendations with patient and his family.     03/18/24 9468   Service Assessment   Patient Orientation Alert and Oriented   Cognition Alert   History Provided By Patient;Child/Family  (Aletha Adorno)   Primary Caregiver Self   Accompanied By/Relationship Aletha Adorno   Support Systems Children;Family Members  (2 daughter and daughter in law, Zuri who is a nurse in [place], [phone])   Patient's Healthcare Decision Maker is: Legal Next of Kin   PCP Verified by CM Yes   Last Visit to PCP Within last 3 months   Prior Functional Level Independent in ADLs/IADLs   Current Functional Level Independent in ADLs/IADLs   Can patient return to prior living arrangement Yes   Ability to make needs known: Good   Family able to assist with home care needs: Yes   Would you like for me to discuss the discharge plan with any other family members/significant others, and if so, who? Yes  (Daughters and DIL)   Financial Resources Medicare   Community Resources None   Social/Functional History   Lives With Alone   Type of Home House   Home Layout One level   Home Access Level entry   Home Equipment None   Receives Help From Family   ADL Assistance Independent   Homemaking Assistance Independent   Ambulation Assistance Independent   Transfer Assistance Independent   Active  Yes   Mode of Transportation Car   Occupation Retired   Discharge Planning   Type of Residence House   Living Arrangements Alone   Current Services Prior To Admission None

## 2024-03-18 NOTE — NURSE NAVIGATOR
CHF teaching completed.CHF background completed. Teaching emphasis on Call Cardiology STAT ,if any of the following are noted:  Short of Breath; with activity or without/ while reclined, Generalize weakness, edema are noted individually or grouped.  Cardiac Diet  and salt/fluid restrictions covered.  Daily WTs emphasized.  Planner with summarization sheet provided with cardiology service and phone number and bathroom scale offered.  Total time @ BS is 1 hour and pt verbalize understanding/past post test.  Pt instructed to call myself, if any questions occur.  Teaching primarily done with daughter. They did not wish for the  to be used.

## 2024-03-18 NOTE — ACP (ADVANCE CARE PLANNING)
Advance Care Planning     Advance Care Planning Clinical Specialist  Conversation Note      Date of ACP Conversation: 3/18/2024    Conversation Conducted with: Patient with Decision Making Capacity    ACP Clinical Specialist: CAITLIN ROSE, RN    Healthcare Decision Maker:     Current Designated Healthcare Decision Maker:     Primary Decision Maker: Salomón Miguel - Other - [phone]    Primary Decision Maker: MiguelDavian - [facility] - [phone]  Click here to complete Healthcare Decision Makers including section of the Healthcare Decision Maker Relationship (ie \"Primary\")  Today we documented Decision Maker(s) consistent with Legal Next of Kin hierarchy.    Care Preferences- Full code status

## 2024-03-19 PROBLEM — M10.9 GOUT ATTACK: Status: ACTIVE | Noted: 2024-03-19

## 2024-03-19 LAB
ANION GAP SERPL CALC-SCNC: 8 MMOL/L (ref 2–11)
BACTERIA SPEC CULT: ABNORMAL
BUN SERPL-MCNC: 60 MG/DL (ref 8–23)
CALCIUM SERPL-MCNC: 9.2 MG/DL (ref 8.3–10.4)
CHLORIDE SERPL-SCNC: 100 MMOL/L (ref 103–113)
CO2 SERPL-SCNC: 30 MMOL/L (ref 21–32)
CREAT SERPL-MCNC: 2.6 MG/DL (ref 0.8–1.5)
GLUCOSE BLD STRIP.AUTO-MCNC: 125 MG/DL (ref 65–100)
GLUCOSE BLD STRIP.AUTO-MCNC: 136 MG/DL (ref 65–100)
GLUCOSE BLD STRIP.AUTO-MCNC: 142 MG/DL (ref 65–100)
GLUCOSE BLD STRIP.AUTO-MCNC: 244 MG/DL (ref 65–100)
GLUCOSE BLD STRIP.AUTO-MCNC: 64 MG/DL (ref 65–100)
GLUCOSE SERPL-MCNC: 64 MG/DL (ref 65–100)
MAGNESIUM SERPL-MCNC: 2.4 MG/DL (ref 1.8–2.4)
POTASSIUM SERPL-SCNC: 3.4 MMOL/L (ref 3.5–5.1)
SERVICE CMNT-IMP: ABNORMAL
SODIUM SERPL-SCNC: 138 MMOL/L (ref 136–146)
URATE SERPL-MCNC: 9.7 MG/DL (ref 2.6–6)

## 2024-03-19 PROCEDURE — 83735 ASSAY OF MAGNESIUM: CPT

## 2024-03-19 PROCEDURE — 6370000000 HC RX 637 (ALT 250 FOR IP): Performed by: PHYSICIAN ASSISTANT

## 2024-03-19 PROCEDURE — 80048 BASIC METABOLIC PNL TOTAL CA: CPT

## 2024-03-19 PROCEDURE — 6370000000 HC RX 637 (ALT 250 FOR IP): Performed by: STUDENT IN AN ORGANIZED HEALTH CARE EDUCATION/TRAINING PROGRAM

## 2024-03-19 PROCEDURE — 36415 COLL VENOUS BLD VENIPUNCTURE: CPT

## 2024-03-19 PROCEDURE — 6360000002 HC RX W HCPCS: Performed by: PHYSICIAN ASSISTANT

## 2024-03-19 PROCEDURE — 2580000003 HC RX 258: Performed by: PHYSICIAN ASSISTANT

## 2024-03-19 PROCEDURE — 82962 GLUCOSE BLOOD TEST: CPT

## 2024-03-19 PROCEDURE — 97535 SELF CARE MNGMENT TRAINING: CPT

## 2024-03-19 PROCEDURE — 97112 NEUROMUSCULAR REEDUCATION: CPT

## 2024-03-19 PROCEDURE — 97165 OT EVAL LOW COMPLEX 30 MIN: CPT

## 2024-03-19 PROCEDURE — 2140000000 HC CCU INTERMEDIATE R&B

## 2024-03-19 PROCEDURE — 6370000000 HC RX 637 (ALT 250 FOR IP): Performed by: INTERNAL MEDICINE

## 2024-03-19 PROCEDURE — 99231 SBSQ HOSP IP/OBS SF/LOW 25: CPT | Performed by: INTERNAL MEDICINE

## 2024-03-19 PROCEDURE — 84550 ASSAY OF BLOOD/URIC ACID: CPT

## 2024-03-19 RX ORDER — ALLOPURINOL 100 MG/1
100 TABLET ORAL DAILY
Status: DISCONTINUED | OUTPATIENT
Start: 2024-03-19 | End: 2024-03-20 | Stop reason: HOSPADM

## 2024-03-19 RX ORDER — POTASSIUM CHLORIDE 20 MEQ/1
40 TABLET, EXTENDED RELEASE ORAL 2 TIMES DAILY PRN
Status: DISCONTINUED | OUTPATIENT
Start: 2024-03-19 | End: 2024-03-20 | Stop reason: HOSPADM

## 2024-03-19 RX ORDER — POTASSIUM CHLORIDE 29.8 MG/ML
20 INJECTION INTRAVENOUS PRN
Status: DISCONTINUED | OUTPATIENT
Start: 2024-03-19 | End: 2024-03-20 | Stop reason: HOSPADM

## 2024-03-19 RX ORDER — MAGNESIUM SULFATE IN WATER 40 MG/ML
2000 INJECTION, SOLUTION INTRAVENOUS PRN
Status: DISCONTINUED | OUTPATIENT
Start: 2024-03-19 | End: 2024-03-20 | Stop reason: HOSPADM

## 2024-03-19 RX ORDER — POTASSIUM CHLORIDE 7.45 MG/ML
10 INJECTION INTRAVENOUS PRN
Status: DISCONTINUED | OUTPATIENT
Start: 2024-03-19 | End: 2024-03-20 | Stop reason: HOSPADM

## 2024-03-19 RX ORDER — PREDNISONE 20 MG/1
20 TABLET ORAL DAILY
Status: DISCONTINUED | OUTPATIENT
Start: 2024-03-19 | End: 2024-03-20 | Stop reason: HOSPADM

## 2024-03-19 RX ADMIN — TAMSULOSIN HYDROCHLORIDE 0.4 MG: 0.4 CAPSULE ORAL at 10:31

## 2024-03-19 RX ADMIN — FUROSEMIDE 40 MG: 40 TABLET ORAL at 18:37

## 2024-03-19 RX ADMIN — PREDNISONE 20 MG: 20 TABLET ORAL at 20:48

## 2024-03-19 RX ADMIN — SODIUM CHLORIDE, PRESERVATIVE FREE 10 ML: 5 INJECTION INTRAVENOUS at 10:35

## 2024-03-19 RX ADMIN — LEVOTHYROXINE SODIUM 88 MCG: 0.09 TABLET ORAL at 05:11

## 2024-03-19 RX ADMIN — SACUBITRIL AND VALSARTAN 1 TABLET: 49; 51 TABLET, FILM COATED ORAL at 10:31

## 2024-03-19 RX ADMIN — SODIUM CHLORIDE, PRESERVATIVE FREE 10 ML: 5 INJECTION INTRAVENOUS at 20:48

## 2024-03-19 RX ADMIN — DOXAZOSIN 4 MG: 4 TABLET ORAL at 10:30

## 2024-03-19 RX ADMIN — SODIUM BICARBONATE 650 MG TABLET 650 MG: at 18:37

## 2024-03-19 RX ADMIN — ALOGLIPTIN 6.25 MG: 6.25 TABLET, FILM COATED ORAL at 10:31

## 2024-03-19 RX ADMIN — ENOXAPARIN SODIUM 30 MG: 100 INJECTION SUBCUTANEOUS at 10:30

## 2024-03-19 RX ADMIN — METOPROLOL TARTRATE 25 MG: 25 TABLET, FILM COATED ORAL at 20:48

## 2024-03-19 RX ADMIN — FUROSEMIDE 40 MG: 40 TABLET ORAL at 10:30

## 2024-03-19 RX ADMIN — SODIUM BICARBONATE 650 MG TABLET 650 MG: at 10:31

## 2024-03-19 RX ADMIN — EMPAGLIFLOZIN 10 MG: 10 TABLET, FILM COATED ORAL at 10:31

## 2024-03-19 RX ADMIN — ALLOPURINOL 100 MG: 100 TABLET ORAL at 20:48

## 2024-03-19 RX ADMIN — CLOPIDOGREL BISULFATE 75 MG: 75 TABLET ORAL at 10:31

## 2024-03-19 RX ADMIN — METOPROLOL TARTRATE 25 MG: 25 TABLET, FILM COATED ORAL at 10:30

## 2024-03-19 RX ADMIN — ATORVASTATIN CALCIUM 80 MG: 80 TABLET, FILM COATED ORAL at 10:30

## 2024-03-19 RX ADMIN — SACUBITRIL AND VALSARTAN 1 TABLET: 49; 51 TABLET, FILM COATED ORAL at 20:48

## 2024-03-19 RX ADMIN — MONTELUKAST 10 MG: 10 TABLET, FILM COATED ORAL at 20:48

## 2024-03-19 RX ADMIN — SODIUM BICARBONATE 650 MG TABLET 650 MG: at 20:48

## 2024-03-19 RX ADMIN — INSULIN GLARGINE 60 UNITS: 100 INJECTION, SOLUTION SUBCUTANEOUS at 21:36

## 2024-03-19 RX ADMIN — SODIUM BICARBONATE 650 MG TABLET 650 MG: at 14:03

## 2024-03-19 NOTE — CONSULTS
PULMONARY/CRITICAL CARE CONSULT NOTE           3/16/2024    Conner VICKIE Lamont                        Date of Admission:  3/15/2024    The patient's chart is reviewed and the patient is discussed with the staff.    Subjective:     Patient is a 83 y.o.  male seen and evaluated at the request of Dr. Lee.     He has a history of CKD, HTN, DM, CAD s/p CABG in [place] in 2004, PCI in the past, HLD, HFrEF 40-45% and grade 2 diastolic dysfunction.   He was admitted 3/16 with increased SOB associated with increased LE edema without fever, chills, or other infectious symptoms.   Received IV lasix in the ER. Required bipap for hypoxia without hypercarbia and was admitted to the ICU.   WBC only 11 and BNP 4500.     He was noted to have distended bladder overnight and lal was advanced with immediate large amount of urine outpt.     CXR 3/15/24 with signs of heart failure.     He is currently weaned off bipap to airvo at 40% satting 100%.     Review of Systems: Comprehensive ROS negative except in HPI    Current Outpatient Medications   Medication Instructions    atorvastatin (LIPITOR) 80 mg, Oral, DAILY    clopidogrel (PLAVIX) 75 mg, DAILY    cycloSPORINE, PF, (CEQUA) 0.09 % SOLN 1 drop, Ophthalmic, 2 times daily, Both Eyes     empagliflozin (JARDIANCE) 10 mg, Oral, DAILY    Fe Fum-FA-B Lqj-S-Nl-Mg-Mn-Cu (CENTRATEX) 106-1 MG CAPS 1 capsule, Oral, DAILY    ferrous sulfate (IRON 325) 325 mg, Oral, 2 times daily    furosemide (LASIX) 40 mg, Oral, 2 TIMES DAILY    insulin aspart (NOVOLOG) 20 Units, SubCUTAneous    insulin glargine (LANTUS) 30 Units, SubCUTAneous, 2 TIMES DAILY, 30 U in AM & 30 U at night     levothyroxine (SYNTHROID) 88 mcg, Oral, DAILY    metoprolol tartrate (LOPRESSOR) 25 mg, Oral, 2 TIMES DAILY    montelukast (SINGULAIR) 10 mg, Oral, PRN    nitroGLYCERIN (NITROSTAT) 0.3 mg, SubLINGual, EVERY 5 MIN PRN, up to max of 3 total doses. If no relief after 1 dose, call 911.    sacubitril-valsartan (ENTRESTO) 
49-51 MG per tablet 1 tablet  1 tablet Oral BID    alogliptin (NESINA) tablet 6.25 mg  6.25 mg Oral Daily    sodium bicarbonate tablet 650 mg  650 mg Oral 4x Daily    tamsulosin (FLOMAX) capsule 0.4 mg  0.4 mg Oral Daily    doxazosin (CARDURA) tablet 4 mg  4 mg Oral Daily    sodium chloride flush 0.9 % injection 5-40 mL  5-40 mL IntraVENous 2 times per day    sodium chloride flush 0.9 % injection 5-40 mL  5-40 mL IntraVENous PRN    0.9 % sodium chloride infusion   IntraVENous PRN    enoxaparin Sodium (LOVENOX) injection 30 mg  30 mg SubCUTAneous Daily    ondansetron (ZOFRAN-ODT) disintegrating tablet 4 mg  4 mg Oral Q8H PRN    Or    ondansetron (ZOFRAN) injection 4 mg  4 mg IntraVENous Q6H PRN    polyethylene glycol (GLYCOLAX) packet 17 g  17 g Oral Daily PRN    acetaminophen (TYLENOL) tablet 650 mg  650 mg Oral Q6H PRN    Or    acetaminophen (TYLENOL) suppository 650 mg  650 mg Rectal Q6H PRN    insulin lispro (HUMALOG) injection vial 0-4 Units  0-4 Units SubCUTAneous TID WC    insulin lispro (HUMALOG) injection vial 0-4 Units  0-4 Units SubCUTAneous Nightly    nitroGLYCERIN (NITROSTAT) SL tablet 0.4 mg  0.4 mg SubLINGual Q5 Min PRN    albuterol (PROVENTIL) (2.5 MG/3ML) 0.083% nebulizer solution 2.5 mg  2.5 mg Nebulization Q6H PRN    nitroGLYCERIN 200 mcg/mL in dextrose 5%  5-200 mcg/min IntraVENous Continuous       Signed:  Edgardo Salinas MD    Part of this note may have been written by using a voice dictation software.  The note has been proof read but may still contain some grammatical/other typographical errors.

## 2024-03-20 VITALS
WEIGHT: 180.2 LBS | DIASTOLIC BLOOD PRESSURE: 55 MMHG | SYSTOLIC BLOOD PRESSURE: 121 MMHG | HEART RATE: 62 BPM | BODY MASS INDEX: 33.16 KG/M2 | OXYGEN SATURATION: 96 % | TEMPERATURE: 97.5 F | RESPIRATION RATE: 17 BRPM | HEIGHT: 62 IN

## 2024-03-20 PROBLEM — M10.9 GOUT ATTACK: Status: RESOLVED | Noted: 2024-03-19 | Resolved: 2024-03-20

## 2024-03-20 LAB
ANION GAP SERPL CALC-SCNC: 7 MMOL/L (ref 2–11)
BUN SERPL-MCNC: 61 MG/DL (ref 8–23)
CALCIUM SERPL-MCNC: 8.9 MG/DL (ref 8.3–10.4)
CHLORIDE SERPL-SCNC: 97 MMOL/L (ref 103–113)
CO2 SERPL-SCNC: 31 MMOL/L (ref 21–32)
CREAT SERPL-MCNC: 2.6 MG/DL (ref 0.8–1.5)
GLUCOSE BLD STRIP.AUTO-MCNC: 265 MG/DL (ref 65–100)
GLUCOSE BLD STRIP.AUTO-MCNC: 276 MG/DL (ref 65–100)
GLUCOSE SERPL-MCNC: 191 MG/DL (ref 65–100)
MAGNESIUM SERPL-MCNC: 2.2 MG/DL (ref 1.8–2.4)
POTASSIUM SERPL-SCNC: 4.2 MMOL/L (ref 3.5–5.1)
SERVICE CMNT-IMP: ABNORMAL
SERVICE CMNT-IMP: ABNORMAL
SODIUM SERPL-SCNC: 135 MMOL/L (ref 136–146)

## 2024-03-20 PROCEDURE — 6370000000 HC RX 637 (ALT 250 FOR IP): Performed by: PHYSICIAN ASSISTANT

## 2024-03-20 PROCEDURE — 6370000000 HC RX 637 (ALT 250 FOR IP): Performed by: INTERNAL MEDICINE

## 2024-03-20 PROCEDURE — 6360000002 HC RX W HCPCS: Performed by: PHYSICIAN ASSISTANT

## 2024-03-20 PROCEDURE — 2580000003 HC RX 258: Performed by: PHYSICIAN ASSISTANT

## 2024-03-20 PROCEDURE — 36415 COLL VENOUS BLD VENIPUNCTURE: CPT

## 2024-03-20 PROCEDURE — 82962 GLUCOSE BLOOD TEST: CPT

## 2024-03-20 PROCEDURE — 6370000000 HC RX 637 (ALT 250 FOR IP): Performed by: STUDENT IN AN ORGANIZED HEALTH CARE EDUCATION/TRAINING PROGRAM

## 2024-03-20 PROCEDURE — 80048 BASIC METABOLIC PNL TOTAL CA: CPT

## 2024-03-20 PROCEDURE — 83735 ASSAY OF MAGNESIUM: CPT

## 2024-03-20 RX ORDER — CLOPIDOGREL BISULFATE 75 MG/1
75 TABLET ORAL DAILY
Qty: 30 TABLET | Refills: 3 | Status: SHIPPED | OUTPATIENT
Start: 2024-03-20

## 2024-03-20 RX ORDER — NITROGLYCERIN 0.3 MG/1
0.3 TABLET SUBLINGUAL EVERY 5 MIN PRN
Qty: 30 TABLET | Refills: 3 | Status: SHIPPED | OUTPATIENT
Start: 2024-03-20

## 2024-03-20 RX ORDER — TAMSULOSIN HYDROCHLORIDE 0.4 MG/1
0.4 CAPSULE ORAL DAILY
Qty: 30 CAPSULE | Refills: 3 | Status: SHIPPED | OUTPATIENT
Start: 2024-03-20

## 2024-03-20 RX ORDER — ALLOPURINOL 100 MG/1
100 TABLET ORAL DAILY
Qty: 30 TABLET | Refills: 3 | Status: SHIPPED | OUTPATIENT
Start: 2024-03-21

## 2024-03-20 RX ORDER — TERAZOSIN 5 MG/1
5 CAPSULE ORAL NIGHTLY
Qty: 30 CAPSULE | Refills: 3 | Status: SHIPPED | OUTPATIENT
Start: 2024-03-20

## 2024-03-20 RX ORDER — PREDNISONE 20 MG/1
20 TABLET ORAL DAILY
Qty: 3 TABLET | Refills: 0 | Status: SHIPPED | OUTPATIENT
Start: 2024-03-21 | End: 2024-03-24

## 2024-03-20 RX ADMIN — ENOXAPARIN SODIUM 30 MG: 100 INJECTION SUBCUTANEOUS at 08:58

## 2024-03-20 RX ADMIN — SACUBITRIL AND VALSARTAN 1 TABLET: 49; 51 TABLET, FILM COATED ORAL at 08:57

## 2024-03-20 RX ADMIN — LEVOTHYROXINE SODIUM 88 MCG: 0.09 TABLET ORAL at 04:55

## 2024-03-20 RX ADMIN — SODIUM BICARBONATE 650 MG TABLET 650 MG: at 08:57

## 2024-03-20 RX ADMIN — EMPAGLIFLOZIN 10 MG: 10 TABLET, FILM COATED ORAL at 08:58

## 2024-03-20 RX ADMIN — INSULIN LISPRO 2 UNITS: 100 INJECTION, SOLUTION INTRAVENOUS; SUBCUTANEOUS at 08:58

## 2024-03-20 RX ADMIN — SODIUM CHLORIDE, PRESERVATIVE FREE 10 ML: 5 INJECTION INTRAVENOUS at 08:58

## 2024-03-20 RX ADMIN — TAMSULOSIN HYDROCHLORIDE 0.4 MG: 0.4 CAPSULE ORAL at 08:57

## 2024-03-20 RX ADMIN — ALLOPURINOL 100 MG: 100 TABLET ORAL at 08:58

## 2024-03-20 RX ADMIN — PREDNISONE 20 MG: 20 TABLET ORAL at 08:57

## 2024-03-20 RX ADMIN — SODIUM BICARBONATE 650 MG TABLET 650 MG: at 12:25

## 2024-03-20 RX ADMIN — INSULIN LISPRO 2 UNITS: 100 INJECTION, SOLUTION INTRAVENOUS; SUBCUTANEOUS at 12:25

## 2024-03-20 RX ADMIN — DOXAZOSIN 4 MG: 4 TABLET ORAL at 08:58

## 2024-03-20 RX ADMIN — CLOPIDOGREL BISULFATE 75 MG: 75 TABLET ORAL at 08:58

## 2024-03-20 RX ADMIN — FUROSEMIDE 40 MG: 40 TABLET ORAL at 08:58

## 2024-03-20 RX ADMIN — ATORVASTATIN CALCIUM 80 MG: 80 TABLET, FILM COATED ORAL at 08:58

## 2024-03-20 RX ADMIN — ALOGLIPTIN 6.25 MG: 6.25 TABLET, FILM COATED ORAL at 08:58

## 2024-03-20 RX ADMIN — METOPROLOL TARTRATE 25 MG: 25 TABLET, FILM COATED ORAL at 08:57

## 2024-03-20 NOTE — DISCHARGE INSTRUCTIONS
nurse or pharmacist.  Copyright 6836-6267 [employer] Version: 10.01. Revision date: 3/28/2019.  Care instructions adapted under license by [employer]. If you have questions about a medical condition or this instruction, always ask your healthcare professional. [employer] disclaims any warranty or liability for your use of this information.       sitagliptin  Pronunciation:  LILIBETH davis glip tin  Brand:  Januvia  What is the most important information I should know about sitagliptin?  Call your doctor if you have symptoms of heart failure --shortness of breath (even while lying down), swelling in your legs or feet, rapid weight gain.  Stop taking sitagliptin and call your doctor if you have symptoms of pancreatitis: severe pain in your upper stomach spreading to your back, with or without vomiting.  What is sitagliptin?  Sitagliptin is used together with diet and exercise to improve blood sugar control in adults with type 2 diabetes mellitus. Sitagliptin is not for treating type 1 diabetes.  Sitagliptin may also be used for purposes not listed in this medication guide.  What should I discuss with my healthcare provider before taking sitagliptin?  You should not use sitagliptin if you are allergic to it, or if you have diabetic ketoacidosis (call your doctor for treatment with insulin).  Tell your doctor if you have ever had:  kidney disease (or if you are on dialysis);  heart problems;  pancreatitis;  high triglycerides (a type of fat in the blood);  gallstones; or  alcoholism.  Follow your doctor's instructions about using this medicine if you are pregnant or you become pregnant. Controlling diabetes is very important during pregnancy, and having high blood sugar may cause complications in both the mother and the baby.  Your name may need to be listed on a sitagliptin pregnancy registry when you start using this medicine.  It may not be safe to breast-feed a baby while you are using this medicine.

## 2024-03-20 NOTE — PLAN OF CARE
Problem: Discharge Planning  Goal: Discharge to home or other facility with appropriate resources  Outcome: Completed  Flowsheets (Taken 3/20/2024 0800 by Roberta Payne, RN)  Discharge to home or other facility with appropriate resources: Identify barriers to discharge with patient and caregiver     Problem: Pain  Goal: Verbalizes/displays adequate comfort level or baseline comfort level  Outcome: Completed     Problem: Safety - Adult  Goal: Free from fall injury  Outcome: Completed     Problem: Chronic Conditions and Co-morbidities  Goal: Patient's chronic conditions and co-morbidity symptoms are monitored and maintained or improved  Outcome: Completed  Flowsheets (Taken 3/20/2024 0800 by Roberta Payne, RN)  Care Plan - Patient's Chronic Conditions and Co-Morbidity Symptoms are Monitored and Maintained or Improved: Monitor and assess patient's chronic conditions and comorbid symptoms for stability, deterioration, or improvement     Problem: Skin/Tissue Integrity  Goal: Absence of new skin breakdown  Description: 1.  Monitor for areas of redness and/or skin breakdown  2.  Assess vascular access sites hourly  3.  Every 4-6 hours minimum:  Change oxygen saturation probe site  4.  Every 4-6 hours:  If on nasal continuous positive airway pressure, respiratory therapy assess nares and determine need for appliance change or resting period.  Outcome: Completed

## 2024-03-20 NOTE — PROGRESS NOTES
Conner Miguel  Admission Date: 3/15/2024         Daily Progress Note: 3/17/2024    The patient's chart is reviewed and the patient is discussed with the staff.    Background: Patient is a 83 y.o.  male seen and evaluated at the request of Dr. Lee.      He has a history of CKD, HTN, DM, CAD s/p CABG in [place] in 2004, PCI in the past, HLD, HFrEF 40-45% and grade 2 diastolic dysfunction.   He was admitted 3/16 with increased SOB associated with increased LE edema without fever, chills, or other infectious symptoms.   Received IV lasix in the ER. Required bipap for hypoxia without hypercarbia and was admitted to the ICU.   WBC only 11 and BNP 4500.      He was noted to have distended bladder overnight and lal was advanced with immediate large amount of urine outpt.      CXR 3/15/24 with signs of heart failure.      He is currently weaned off bipap to airvo at 40% satting 100%.     Subjective:     Pt has been weaned to room air. Looking good. Only complaint is toe pain.     Current Facility-Administered Medications   Medication Dose Route Frequency    tetrahydrozoline 0.05 % ophthalmic solution 1 drop  1 drop Both Eyes Q4H PRN    atorvastatin (LIPITOR) tablet 80 mg  80 mg Oral Daily    clopidogrel (PLAVIX) tablet 75 mg  75 mg Oral Daily    empagliflozin (JARDIANCE) tablet 10 mg  10 mg Oral Daily    insulin glargine (LANTUS) injection vial 60 Units  60 Units SubCUTAneous Nightly    levothyroxine (SYNTHROID) tablet 88 mcg  88 mcg Oral Daily    metoprolol tartrate (LOPRESSOR) tablet 25 mg  25 mg Oral BID    montelukast (SINGULAIR) tablet 10 mg  10 mg Oral Nightly    sacubitril-valsartan (ENTRESTO) 49-51 MG per tablet 1 tablet  1 tablet Oral BID    alogliptin (NESINA) tablet 6.25 mg  6.25 mg Oral Daily    sodium bicarbonate tablet 650 mg  650 mg Oral 4x Daily    tamsulosin (FLOMAX) capsule 0.4 mg  0.4 mg Oral Daily    doxazosin (CARDURA) tablet 4 mg  4 mg Oral Daily    sodium chloride flush 0.9 % 
              [facility] CARDIOLOGY PROGRESS NOTE           3/19/2024 4:51 PM    Admit Date: 3/15/2024      Subjective:   Less sob    Objective:      Vitals:    03/19/24 0441 03/19/24 0719 03/19/24 1139 03/19/24 1613   BP: (!) 100/55 (!) 124/59 (!) 97/40 (!) 97/50   Pulse: 66 67 58 62   Resp: 18 16 17 18   Temp: 98.6 °F (37 °C) 98.4 °F (36.9 °C) 97.9 °F (36.6 °C) 98.1 °F (36.7 °C)   TempSrc: Tympanic Axillary Axillary Axillary   SpO2: 94% 94% 96% 95%   Weight: 82 kg (180 lb 11.2 oz)      Height:           Physical Exam:  General-No Acute Distress  Neck- supple, no JVD  CV- regular rate and rhythm no MRG  Lung- clear bilaterally  Abd- soft, nontender, nondistended  Ext- no edema bilaterally.  Skin- warm and dry    Data Review:   Recent Labs     03/18/24  0355 03/19/24  0436    138   K 3.6 3.4*   MG 1.9 2.4   BUN 52* 60*       Assessment/Plan:     HFpEF  ASCVD  CKD  Diabetic    ///    Increase activity  Home tomorrow      PARAM EARL MD  3/19/2024 4:51 PM   
              [facility] CARDIOLOGY PROGRESS NOTE           3/20/2024 8:47 AM    Admit Date: 3/15/2024      Subjective:   Doing well. Podagra resolved.    Objective:      Vitals:    03/19/24 2332 03/20/24 0336 03/20/24 0403 03/20/24 0710   BP: (!) 119/55 (!) 107/52  (!) 116/55   Pulse: 58 55  63   Resp: 12 16  16   Temp: 99 °F (37.2 °C) 98.2 °F (36.8 °C)  97.9 °F (36.6 °C)   TempSrc: Axillary Axillary  Oral   SpO2: 97% 97%  95%   Weight:   81.7 kg (180 lb 3.2 oz)    Height:           Physical Exam:  General-No Acute Distress  Neck- supple, no JVD  CV- regular rate and rhythm no MRG  Lung- clear bilaterally  Abd- soft, nontender, nondistended  Ext- no edema bilaterally.  Skin- warm and dry    Data Review:   Recent Labs     03/19/24  0436 03/20/24  0355    135*   K 3.4* 4.2   MG 2.4 2.2   BUN 60* 61*       Assessment/Plan:     HFpEF  CKD  ASCVD  Podagra  ///  Looks good  Home today      PARAM EARL MD  3/20/2024 8:47 AM   
              [facility] CARDIOLOGY PROGRESS NOTE           3/18/2024 4:57 PM    Admit Date: 3/15/2024      Subjective:   Less sob    Objective:      Vitals:    03/18/24 1138 03/18/24 1200 03/18/24 1300 03/18/24 1618   BP: 108/63 (!) 111/57 95/72 (!) 91/58   Pulse: 62 60 54 64   Resp: 16      Temp: 98.2 °F (36.8 °C)   98.6 °F (37 °C)   TempSrc: Oral   Axillary   SpO2: 98%   98%   Weight:       Height:           Physical Exam:  General-No Acute Distress  Neck- supple, no JVD  CV- regular rate and rhythm no MRG  Lung- clear bilaterally  Abd- soft, nontender, nondistended  Ext- no edema bilaterally.  Skin- warm and dry    Data Review:   Recent Labs     03/15/24  2143 03/17/24  0448 03/18/24  0355    139 140   K 5.3* 4.1 3.6   MG 2.1 1.8 1.9   BUN 35* 43* 52*   WBC 11.2*  --   --    HGB 11.3*  --   --    HCT 36.4*  --   --      --   --        Assessment/Plan:     HFpEF  ASCVD  CKD  Diabetic    ///    Change Lasix to po       PARAM EARL MD  3/18/2024 4:57 PM   
       Hospitalist Progress Note   Admit Date:  3/15/2024  9:22 PM   Name:  Conner Miguel   Age:  83 y.o.  Sex:  male  :  1940   MRN:  [id number]   Room:  [facility]/    Presenting/Chief Complaint: Shortness of Breath and Arm Pain     Reason(s) for Admission: Respiratory distress [R06.03]  Hypoxia [R09.02]  Acute on chronic systolic (congestive) heart failure (HCC) [I50.23]  Acute congestive heart failure, unspecified heart failure type (HCC) [I50.9]     Hospital Course:     Conner Miguel is a 83 y.o. male with history of coronary artery disease status post CABG , hyperlipidemia, diabetes, hypertension, CKD, heart failure reduced ejection fraction 40 to 45% who presented with shortness of breath with increased lower extremity edema.  Hospital service consulted for management of acute onset gout to his right great toe.     Subjective & 24hr Events:   Patient states gout is improving. Otherwise feels well. No complaints.       Assessment & Plan:     Acute gout attack: improving   - Elevated uric acid initially   - Patient complaining of right great toe pain; No history of gout in the past   -Start low-dose allopurinol  - Unable to start colchicine, NSAIDs due to patient's kidney disease  - Started prednisone 20 mg x 5 days. Improving so continue course to complete treatment. Continue to monitor glucose levels while on steroid    Anticipated Discharge Arrangements:   Defer to primary team    PT/OT evals and PPD ordered?  Defer to primary team  Diet:  ADULT DIET; Regular; 5 carb choices (75 gm/meal); Low Fat/Low Chol/High Fiber/2 gm Na; 1800 ml; Vegan, Vegetarian (Lacto-Ovo), No Pork, No Beef; NO EGGS  VTE prophylaxis: Defer to primary team  Code status: Full Code      Non-peripheral Lines and Tubes (if present):          Telemetry (if present):  Cardiac/Telemetry Monitor On: Portable telemetry pack applied        Hospital Problems:  Principal Problem:    Acute on chronic systolic (congestive) heart failure 
ACUTE PHYSICAL THERAPY GOALS:   (Developed with and agreed upon by patient and/or caregiver.)  Pt will perform bed mobility and transfers Mod (I) c LRAD/ external supports as needed and no LOB or miss-steps in 7 therapy sessions.  Pt will ambulate 250 ft Mod (I) with use of LRAD, no LOB or miss-steps and breaks as needed in 7 therapy sessions.  Pt will perform standing dynamic balance activities with minimal postural sway in 7 therapy sessions.  Pt will tolerate multiple sets and reps of BLE exercises in 7 therapy sessions.      PHYSICAL THERAPY Initial Assessment, Daily Note, and AM  (Link to Caseload Tracking: PT Visit Days : 1  Acknowledge Orders  Time In/Out  PT Charge Capture  Rehab Caseload Tracker    Conner Miguel is a 83 y.o. male   PRIMARY DIAGNOSIS: Acute on chronic systolic (congestive) heart failure (HCC)  Respiratory distress [R06.03]  Hypoxia [R09.02]  Acute on chronic systolic (congestive) heart failure (HCC) [I50.23]  Acute congestive heart failure, unspecified heart failure type (HCC) [I50.9]       Reason for Referral: Generalized Muscle Weakness (M62.81)  Difficulty in walking, Not elsewhere classified (R26.2)  Other abnormalities of gait and mobility (R26.89)  Inpatient: Payor: [employer] MEDICARE / Plan: [employer] GOLD PLUS HMO / Product Type: *No Product type* /     ASSESSMENT:     REHAB RECOMMENDATIONS:   Recommendation to date pending progress:  Setting:  Home Health Therapy vs No Needs pending progression within POC    Equipment:    None  To Be Determined     ASSESSMENT:  Mr. Miguel Is a 83 y.o. male presenting to PT after being admitted on 3/15/24 for acute on chronic systolic HF. At time of initial evaluation, pt presents just below baseline LOF with deficits in strength, standing dynamic balance, gait and activity tolerance limiting his overall functional mobility. Today, pt performed all mobility with all mobility with SB/CG(A), inc time and cueing including ambulation of 120'x1 c 
Called to patient's room patient having altered mental status.  Patient becoming confused and nurse had applied mitts.  Patient's mental status deteriorated within a few minutes he became unresponsive and was intubated due to apnea and loss of airway protection.  Patient's blood pressure was difficult to obtain and pulse oximeter was not picking up and had no pleth.  Patient eventually lost his pulse despite administering multiple vasopressors.  CPR was performed for approximately 10 minutes (see code notes) patient was given multiple rounds of ACLS medications and regained pulse and had sinus tachycardia.  ROSC was achieved patient then had central line placed and we will send for further imaging.  
Ch visited with the pt in his room. CH overviewed the chart prior to the visit, and upon arrival, consulted with the nurse about the pt. The pt was asleep at the time of the visit. The pt appeared to be comfortable. The CH offered pastoral presence and silent prayer for the pt. Ch provided spiritual care and emotional support through pastoral presence, non-anxious presence, and silent prayer. CH is available as needed.   
Informed by PCT that pt's blood sugar was 64 mg/dl. Pt non-symptomatic. Patient given orange juice, breakfast en route to bedside. Will recheck in one hour.   
Patient arrived in the ICU, vital signs obtained. Patient complains of pain in the bladder. Bladder scan performed, 899 ml of urine in the bladder. Catheter repositioned by MD. Dual skin assessment performed lionel Baig RN. No ulcers, graft scar noted. Daughter in law at bedside  
Patient/caregivers speak Chelsea as their preferred language for their healthcare communication. For safe communication, use the AMN  carts or call:    AMN phone services for [facility] at [phone]  General phone: [phone] ( [phone])  Email: [email or web address]    Always document the use of interpreting services ('s ID number) in your clinical notes.    Our interpreters are available for team members working with limited  English proficient (LEP) patients remotely, via phone or video or in person (if needed for special cases).    When using family members to interpret, for the safety of the patient and protection of the communication of both our patient and [facility] staff the VRI or telephonic  should remain on the line to monitor that all communication is accurate and complete. The  should be instructed to notify [facility] staff immediately if there are any inaccuracies.         Thank you,        Jackie WILSON  Senior /Navigator   
Patient/caregivers speak [place] as their preferred language for their healthcare communication. For safe communication, use the AMN  carts or call:    AMN phone services for [facility] at [phone]  General phone: [phone] ( [phone])  Email: [email or web address]    Always document the use of interpreting services ('s ID number) in your clinical notes.    Our interpreters are available for team members working with limited  English proficient (LEP) patients remotely, via phone or video or in person (if needed for special cases).    When using family members to interpret, for the safety of the patient and protection of the communication of both our patient and [facility] staff the VRI or telephonic  should remain on the line to monitor that all communication is accurate and complete. The  should be instructed to notify [facility] staff immediately if there are any inaccuracies.         Thank you,        Jackie WILSON  Senior /Navigator   
Patient/caregivers speak [place] as their preferred language for their healthcare communication. For safe communication, use the AMN  carts or call:    AMN phone services for [facility] at [phone]  General phone: [phone] ( [phone])  Email: [email or web address]    Always document the use of interpreting services ('s ID number) in your clinical notes.    Our interpreters are available for team members working with limited  English proficient (LEP) patients remotely, via phone or video or in person (if needed for special cases).    When using family members to interpret, for the safety of the patient and protection of the communication of both our patient and [facility] staff the VRI or telephonic  should remain on the line to monitor that all communication is accurate and complete. The  should be instructed to notify [facility] staff immediately if there are any inaccuracies.         Thank you,        Jackie WILSON  Senior /Navigator   
Pt had been weaned from BIPAP, was on tele, sudden desat to 85% w increased respiratory distress, increased RR, with 9/10 SSCP not radiating, with diaphoresis. Lal placed, some blood tinged urine output.  EKG obtained showing t wave abnormalities.  Given another STAT dose of lasix, placed on BIPAP, transferring to the ICU, started on lasix drip, albuterol, morphine and inch of nitro paste placed.  Per daughter severe bladder pain.     Vitals:    03/16/24 0530   BP: (!) 162/90   Pulse: (!) 118   Resp: (!) 40   Temp:    SpO2:      Pt diaphoretic, reporting CP, pursed lip breathing  CV: RRR tach 120  Pul: Decreased  Musc 2+ B edema      Daughter in law states pt full code.   Nursing to bladder scan and irrigate lal.  May need urology. Add nitro drip, will need palliative care. Consult intensivist- discussed w Dr Ortiz.     NOMAN Shane   
Pt in distress secondary to fluid overload and placed on BIPAP. Will transfer to ICU for ongoing  Management.     03/16/24 0536   NIV Type   NIV Started/Stopped On   Equipment Type v60   Mode Bilevel   Mask Type Full face mask   Mask Size Medium   Assessment   SpO2 99 %   Comfort Level Fair   Using Accessory Muscles Yes   Mask Compliance Good   Skin Assessment Clean, dry, & intact   Settings/Measurements   PIP Observed 15 cm H20   IPAP 14 cmH20   CPAP/EPAP 8 cmH2O   Vt (Measured) 509 mL   Rate Ordered 20   Insp Rise Time (%) 3 %   FiO2  40 %   I Time/ I Time % 0.9 s   Mask Leak (lpm) 0 lpm   Patient's Home Machine No   Alarm Settings   Alarms On Y   Low Pressure (cmH2O) 5 cmH2O   High Pressure (cmH2O) 30 cmH2O   Delay Alarm 20 sec(s)   Apnea (secs) 20 secs   RR Low (bpm) 10   RR High (bpm) 50 br/min       
RRT Clinical Rounding Nurse Progress Report      SUBJECTIVE: Patient assessed secondary to transfer from critical care.      Vitals:    03/17/24 1920 03/17/24 2000 03/17/24 2200 03/17/24 2312   BP: 137/60 (!) 126/59 (!) 105/56 (!) 107/51   Pulse: 77 58 59 61   Resp: 27 15 15 16   Temp: 99.3 °F (37.4 °C)   98.2 °F (36.8 °C)   TempSrc: Oral   Oral   SpO2: 98% 97% 97% 94%   Weight:       Height:            DETERIORATION INDEX SCORE: 22    ASSESSMENT:   Pt is alert and oriented at this time, denies any pain or SOB. Respirations are even and unlabored on RA. Lasix gtt running at this time, lal CDI with yellow urine output. No acute distress noted at this time. Primary RN to call with any concerns. VS, labs, and progress notes reviewed.     PLAN:  Will follow per RRT Clinical Rounding Program protocol.    Rossy Ma RN  [facility]: [phone]  [facility]: [phone]    
RRT Clinical Rounding Nurse Progress Report      SUBJECTIVE: Patient assessed secondary to transfer from critical care.      Vitals:    03/17/24 2200 03/17/24 2312 03/18/24 0343 03/18/24 0758   BP: (!) 105/56 (!) 107/51 94/64 (!) 120/56   Pulse: 59 61 63 57   Resp: 15 16 16 16   Temp:  98.2 °F (36.8 °C) 97.9 °F (36.6 °C) 97.5 °F (36.4 °C)   TempSrc:  Oral Oral Axillary   SpO2: 97% 94% 93% 96%   Weight:   85.4 kg (188 lb 4.4 oz)    Height:            DETERIORATION INDEX SCORE: 26    ASSESSMENT:  Patient sitting on the side of the bed about to work with PT. Alert and oriented x4; follows all commands. Remains on lasix gtt with good urine output. Denies any pain or shortness of breath at this time. Lungs clear bilaterally.     PLAN:  Will follow per RRT Clinical Rounding Program protocol.    Kristi Olivas RN  [facility]: [phone]  [facility]: [phone]   
RRT Clinical Rounding Nurse Progress Report      SUBJECTIVE: Patient assessed secondary to transfer from critical care.      Vitals:    03/18/24 1700 03/18/24 2028 03/18/24 2103 03/19/24 0018   BP: (!) 97/56 (!) 98/51 (!) 98/51 (!) 105/47   Pulse: 55 63 63 55   Resp:  18  18   Temp:  98.2 °F (36.8 °C)  98.8 °F (37.1 °C)   TempSrc:  Oral  Oral   SpO2:  96%  94%   Weight:       Height:            DETERIORATION INDEX SCORE: 24    ASSESSMENT:  Previous outreach assessment was reviewed. VS, labs, and progress notes were reviewed. Pt alert and oriented, sitting in bed. Respirations are even and unlabored on RA. Pt was transitioned from lasix gtt to PO today. Sandoval patent and draining with yellow output at this time. No acute distress noted. Primary RN to call with any concerns.     PLAN:  Will follow per RRT Clinical Rounding Program protocol.    Rossy Ma RN  [facility]: [phone]  [facility]: [phone]    
RRT Clinical Rounding Nurse Update    Vitals:    03/18/24 1138 03/18/24 1200 03/18/24 1300 03/18/24 1618   BP: 108/63 (!) 111/57 95/72 (!) 91/58   Pulse: 62 60 54 64   Resp: 16      Temp: 98.2 °F (36.8 °C)   98.6 °F (37 °C)   TempSrc: Oral   Axillary   SpO2: 98%   98%   Weight:       Height:            DETERIORATION INDEX SCORE: 26    ASSESSMENT:  Previous outreach assessment was reviewed. There have been no significant changes since previous assessment.    PLAN:  Will follow per RRT Clinical Rounding Program protocol.    Kristi Olivas RN  [place]: [phone]  [facility]: [phone]   
RRT Clinical Rounding Nurse Update    Vitals:    03/18/24 1700 03/18/24 2028 03/18/24 2103 03/19/24 0018   BP: (!) 97/56 (!) 98/51 (!) 98/51 (!) 105/47   Pulse: 55 63 63 55   Resp:  18  18   Temp:  98.2 °F (36.8 °C)  98.8 °F (37.1 °C)   TempSrc:  Oral  Oral   SpO2:  96%  94%   Weight:       Height:            DETERIORATION INDEX SCORE: 21    ASSESSMENT:  Previous outreach assessment was reviewed. There have been no significant changes since previous assessment.    PLAN:  Will discharge from RRT Clinical Rounding Program per protocol. Please call if needed.    Rossy Ma RN  [facility]: [phone]  [facility]: [phone]    
Sandoval removed per orders at 1230. No complications. Patient encouraged to call RN when voided. Pt verbalizes understanding.   Voiding trial will be updated in this note.     1319: Per nursing student, pt voided 400cc and had bm.   
TRANSFER - IN REPORT:    Verbal report received from DARWIN Abel on Conner Miguel  being received from [facility] for routine progression of patient care      Report consisted of patient's Situation, Background, Assessment and   Recommendations(SBAR).     Information from the following report(s) MAR, Cardiac Rhythm Sinus Tach, and Event Log was reviewed with the receiving nurse.    Opportunity for questions and clarification was provided.      Assessment completed upon patient's arrival to unit and care assumed.     
TRANSFER - IN REPORT:    Verbal report received from Latoya GRANADOS on Conner Miguel being received from ICU () for routine progression of care.     Report consisted of patient’s Situation, Background, Assessment and Recommendations(SBAR).     Information from the following report(s) SBAR, Kardex, and Cardiac Rhythm sinus simi  was reviewed. Opportunity for questions and clarification was provided.      Assessment completed upon patient’s arrival to unit and care assumed.     Patient received to room 328. Patient connected to monitor and assessment completed. Plan of care reviewed. Patient oriented to room and call light. Patient aware to use call light to communicate any chest pain or needs.     Admission skin assessment completed with second RN and reveals the following: sacrum and heels intact and free of redness. Pt present to unit with scattered bruising and 2+ pitting BLE. Dual skin assessment completed with Lindsey Berry RN.    
TRANSFER - OUT REPORT:    Verbal report given to [place] on Conner Miguel  being transferred to [facility] for routine progression of patient care       Report consisted of patient's Situation, Background, Assessment and   Recommendations(SBAR).     Information from the following report(s) Nurse Handoff Report, Intake/Output, MAR, and Event Log was reviewed with the receiving nurse.           Lines:   Peripheral IV 03/15/24 Distal;Posterior;Right Wrist (Active)   Site Assessment Clean, dry & intact 03/17/24 1920   Line Status Flushed;Infusing 03/17/24 1920   Line Care Connections checked and tightened 03/17/24 1920   Phlebitis Assessment No symptoms 03/17/24 1920   Infiltration Assessment 0 03/17/24 1920   Alcohol Cap Used Yes 03/17/24 1920   Dressing Status Clean, dry & intact 03/17/24 1920   Dressing Type Transparent 03/17/24 1920        Opportunity for questions and clarification was provided.      Patient transported with:  Monitor, Patient-specific medications from Pharmacy, and Registered Nurse  Patient's phone at bedside.      Patient's daughter in law Zuri made aware of transfer  
am  3/17/2024 7:08 AM    Admit Date: 3/15/2024    Admit Diagnosis: Respiratory distress [R06.03]  Hypoxia [R09.02]  Acute on chronic systolic (congestive) heart failure (HCC) [I50.23]  Acute congestive heart failure, unspecified heart failure type (HCC) [I50.9]      Subjective:    Patient : Patient appears more hemodynamically stable and is requiring less oxygen support he has responded well to treatment.  Creatinine has remained stable at 2.2.  He has had 6.2 L output since yesterday and is breathing much easier and is no longer on BiPAP    Review of the chart shows that patient has a remote history of CABG in [place].  He had a heart cath done in 2010 which appeared to show a patent LIMA to an LAD and a severely stenosed ostium of the vein graft to the circumflex marginal.  He had stenting of the native circumflex done at that time.  That appears to be the last heart cath done in 2010.  There is a report and an echo showing ejection fraction 45% consistent with heart failure mildly reduced ejection fraction.  Most recent echocardiogram done here shows ejection fraction 55% so there appears to been recovery of his ejection fraction.  Working diagnosis is that he probably had acute diastolic heart failure and respiratory failure which is improved profoundly.  He is now stable to be sent to the floor and should continue with appropriate guideline directed medical therapy consideration for left heart cath with grafts is reasonable pending his renal function.  He does have chronic kidney disease with GFR less than 30 which would of course uniquely complicate his situation for repeat heart cath.  Transfer to the floor continued medical therapy and decision tree by onboarding cardiologist whether to take patient to the Cath Lab for delineation of his anatomy is reasonable    Objective:    BP (!) 115/55   Pulse 53   Temp 98.4 °F (36.9 °C) (Oral)   Resp 22   Ht 1.575 m (5' 2\")   Wt 88.9 kg (196 lb)   SpO2 98%   
Patient participated in upper body dressing, self feeding, and grooming ADLs in unsupported sitting with minimal verbal cueing to increase independence, decrease assistance required, and increase activity tolerance. Patient also participated in functional mobility, functional transfer, and energy conservation training to increase independence, decrease assistance required, and increase activity tolerance.     TREATMENT GRID:  N/A    AFTER TREATMENT PRECAUTIONS: Alarm Activated, Bed/Chair Locked, Call light within reach, Chair, Needs within reach, and RN notified    INTERDISCIPLINARY COLLABORATION:  RN/ PCT, PT/ PTA, and OT/ ZAPIEN    EDUCATION:  Education Given To: Patient  Education Provided: Role of Therapy;Plan of Care  Barriers to Learning: None  Education Outcome: Verbalized understanding;Demonstrated understanding      TOTAL TREATMENT DURATION AND TIME:  Time In: 0807  Time Out: 0832  Minutes: 25      Lizette Barrow, OT

## 2024-03-20 NOTE — CARE COORDINATION
Discharge order placed. CM received a message from family member, Jelly, for [facility] care preference. CM placed order (RN, PT, OT) and sent referral to [facility] via [employer].  Family here to transport home by car.     03/20/24 7333   Services At/After Discharge   Services At/After Discharge PT;OT;Nursing services;Home Health    Resource Information Provided? No   Mode of Transport at Discharge Other (see comment)  (Family to transport by car.)   Confirm Follow Up Transport Family   Condition of Participation: Discharge Planning   The Plan for Transition of Care is related to the following treatment goals: Home with home health   The Patient and/or Patient Representative was provided with a Choice of Provider? Patient;Patient Representative   Name of the Patient Representative who was provided with the Choice of Provider and agrees with the Discharge Plan?  Jelly   The Patient and/Or Patient Representative agree with the Discharge Plan? Yes   Freedom of Choice list was provided with basic dialogue that supports the patient's individualized plan of care/goals, treatment preferences, and shares the quality data associated with the providers?  Yes

## 2024-03-20 NOTE — DISCHARGE SUMMARY
[facility] Cardiology Discharge Summary     Patient ID:  Conner Miguel  [id number]  83 y.o.  1940    Admit date: 3/15/2024    Discharge date:  03/20/2024    Admitting Physician: Roderick Lee MD     Discharge Physician: NOMAN Hill/Dr. Morse    Admission Diagnoses: Respiratory distress [R06.03]  Hypoxia [R09.02]  Acute on chronic systolic (congestive) heart failure (HCC) [I50.23]  Acute congestive heart failure, unspecified heart failure type (HCC) [I50.9]    Discharge Diagnoses:   Patient Active Problem List    Diagnosis Date Noted    Acute on chronic systolic (congestive) heart failure (HCC) 03/16/2024    Diabetes mellitus with diabetic nephropathy, with long-term current use of insulin (HCC) 03/16/2024    CKD (chronic kidney disease) stage 3, GFR 30-59 ml/min ([facility]) 03/16/2024    Hypertension 03/16/2024    Leukocytosis 03/16/2024    CAD (coronary artery disease) 03/16/2024    Gout attack 03/19/2024    Acute pulmonary edema (HCC) 03/16/2024    Acute respiratory failure with hypoxia ([facility]) 03/16/2024    Acute congestive heart failure (HCC) 03/16/2024       Cardiology Procedures this admission:  EchoCardiogram  Consults: internal medicine    Hospital Course: Patient is an 83-year-old male with past medical history of CKD, htn, DM, CAD s/p CABG ([place] 2004) and PCI (2009 - PCI to L circ, LIMA to ALD patent, RCA small distal lesion), glaucoma, hld, and sHF w echo 10/2017 w EF 40-45% w decreased RV function and grade 2 DD who presented to the emergency department of [facility] with complaints of progressive shortness of breath, dry cough, weight gain, and lower extremity edema.  Patient was evaluated and subsequently admitted for further cardiac evaluation and treatment.     Echocardiogram was done and showed: 03/15/24    ECHO (TTE) COMPLETE (PRN CONTRAST/BUBBLE/STRAIN/3D) 03/16/2024  7:04 PM (Final)    Interpretation Summary    Left Ventricle: Normal left ventricular systolic function with a visually

## 2024-03-21 ENCOUNTER — TELEPHONE (OUTPATIENT)
Age: 84
End: 2024-03-21

## 2024-03-21 ENCOUNTER — OFFICE VISIT (OUTPATIENT)
Age: 84
End: 2024-03-21

## 2024-03-21 VITALS
HEIGHT: 62 IN | DIASTOLIC BLOOD PRESSURE: 72 MMHG | HEART RATE: 84 BPM | WEIGHT: 180 LBS | SYSTOLIC BLOOD PRESSURE: 132 MMHG | BODY MASS INDEX: 33.13 KG/M2

## 2024-03-21 DIAGNOSIS — R31.9 HEMATURIA, UNSPECIFIED TYPE: Primary | ICD-10-CM

## 2024-03-21 DIAGNOSIS — I25.10 CORONARY ARTERY DISEASE INVOLVING NATIVE CORONARY ARTERY OF NATIVE HEART WITHOUT ANGINA PECTORIS: ICD-10-CM

## 2024-03-21 DIAGNOSIS — I10 ESSENTIAL HYPERTENSION: ICD-10-CM

## 2024-03-21 DIAGNOSIS — I50.32 CHRONIC HEART FAILURE WITH PRESERVED EJECTION FRACTION (HCC): ICD-10-CM

## 2024-03-21 DIAGNOSIS — N18.30 STAGE 3 CHRONIC KIDNEY DISEASE, UNSPECIFIED WHETHER STAGE 3A OR 3B CKD (HCC): ICD-10-CM

## 2024-03-21 ASSESSMENT — ENCOUNTER SYMPTOMS
SHORTNESS OF BREATH: 0
RESPIRATORY NEGATIVE: 1
COUGH: 0

## 2024-03-21 NOTE — PROGRESS NOTES
bedtime Both Eyes, Disp: , Rfl:     sacubitril-valsartan (ENTRESTO) 49-51 MG per tablet, Take 1 tablet by mouth 2 times daily, Disp: , Rfl:     furosemide (LASIX) 40 MG tablet, Take 1 tablet by mouth 2 times daily, Disp: , Rfl:     insulin aspart (NOVOLOG) 100 UNIT/ML injection pen, Inject 20 Units into the skin, Disp: , Rfl:     insulin glargine (LANTUS) 100 UNIT/ML injection vial, Inject 30 Units into the skin 2 times daily 30 U in AM & 30 U at night, Disp: , Rfl:     montelukast (SINGULAIR) 10 MG tablet, Take 1 tablet by mouth as needed (Allergies), Disp: , Rfl:      ASSESSMENT/PLAN:    1. Chronic heart failure with preserved ejection fraction (HCC)  -Volume status appears to have improved, lungs are clear, no significant edema on examination.  - Continue CHF medical therapy as above, long-term look to change to Toprol-XL formulation.  As patient is less than 24 hours postdischarge will not change medications at this time.  - Will need close monitoring of renal function, plan as below for renal appointment.    2. Coronary artery disease involving native coronary artery of native heart without angina pectoris  -No chest pain, continue medical therapy.  If worsening hematuria may need to consider holding Plavix temporarily.    3. Essential hypertension  -Controlled at this time.    4. Stage 3 chronic kidney disease, unspecified whether stage 3a or 3b CKD ([facility])  -Recommended they call Dr. Ferris in nephrology's office for appointment.    5.  Hematuria  - Suspect due to recent urinary catheter placement and underlying Plavix use.  If bleeding continues or gets worse, or if the patient has difficulty urinating recommended prompt ER evaluation/calling 911, patient and family voiced understanding recommendations.  -Outpatient urology referral placed.    Plan for outpatient follow-up with Dr. Triplett in 4 weeks.  Earlier if needed.    Problem List Items Addressed This Visit          Circulatory    CAD (coronary artery

## 2024-03-21 NOTE — TELEPHONE ENCOUNTER
Care Transitions Initial Follow Up Call    Call within 2 business days of discharge: Yes     Patient: Conner Miguel Patient : 1940 MRN: [id number]    [unfilled]    RARS: Readmission Risk Score: 16.8       Spoke with: Salomón, EC    Discharge department/facility: [facility]    Non-face-to-face services provided:  Spoke with Salomón EC patient was just seen by Dr. Larkin today.     Follow Up  Future Appointments   Date Time Provider Department Center   3/27/2024  1:15 PM Jen Dyer, APRN - CNP [id number] [facility] AM[facility]   2024 11:15 AM Douglas Triplett III, MD [facility]CDG [facility]       Laurita Alexander LPN

## 2024-03-22 ENCOUNTER — TELEPHONE (OUTPATIENT)
Age: 84
End: 2024-03-22

## 2024-03-22 ENCOUNTER — TELEPHONE (OUTPATIENT)
Dept: UROLOGY | Age: 84
End: 2024-03-22

## 2024-03-22 DIAGNOSIS — I25.10 CAD (CORONARY ARTERY DISEASE): ICD-10-CM

## 2024-03-22 DIAGNOSIS — N18.30 CKD (CHRONIC KIDNEY DISEASE) STAGE 3, GFR 30-59 ML/MIN (HCC): ICD-10-CM

## 2024-03-22 DIAGNOSIS — I50.9 ACUTE CONGESTIVE HEART FAILURE (HCC): ICD-10-CM

## 2024-03-22 DIAGNOSIS — I50.23 ACUTE ON CHRONIC SYSTOLIC (CONGESTIVE) HEART FAILURE (HCC): Primary | ICD-10-CM

## 2024-03-22 DIAGNOSIS — I10 HYPERTENSION: ICD-10-CM

## 2024-03-22 NOTE — TELEPHONE ENCOUNTER
----- Message from Nba Larkin DO sent at 3/22/2024 10:33 AM EDT -----      ----- Message -----  From: Ghazala Schneider MA  Sent: 3/22/2024   9:00 AM EDT  To: Nba Larkin DO  Subject: FW: Potassium chloride                           Please advise of pt is to take potassium with furosemide? Potassium not listed in med list or in note. Thanks.  ----- Message -----  From: Conner Miguel  Sent: 3/22/2024   8:34 AM EDT  To: [facility] Cardiology Clinical Staff  Subject: Potassium chloride                               Hello, reaching out for my PEGGY- Conner Miguel for clarification on potassium chloride 10 meq if he needs to take it daily? He’s on Lasix 40 mg BID.   Please advise   Jelly Miguel   [phone]

## 2024-03-22 NOTE — TELEPHONE ENCOUNTER
Daughter in law called to see if the upcoming visit can be virtual instead of in person. Pt lives alone but is currently staying with her until he is well enough to return home. They live over an hour away and he suffers from insomnia.Daughter in law would like a call back. Her name is Zuri. [phone]

## 2024-03-22 NOTE — TELEPHONE ENCOUNTER
Needs some clarification on his Potassium ,lasix and Prednisone . Please call Jelly at [phone], she will not be able to answer phone for a few hours due to being at dentist  but can leave a message on Cell phone

## 2024-03-27 ENCOUNTER — OFFICE VISIT (OUTPATIENT)
Dept: UROLOGY | Age: 84
End: 2024-03-27
Payer: MEDICARE

## 2024-03-27 ENCOUNTER — TELEPHONE (OUTPATIENT)
Dept: UROLOGY | Age: 84
End: 2024-03-27

## 2024-03-27 DIAGNOSIS — R31.0 GROSS HEMATURIA: Primary | ICD-10-CM

## 2024-03-27 LAB
BILIRUBIN, URINE, POC: NEGATIVE
BLOOD URINE, POC: NORMAL
GLUCOSE URINE, POC: 500
KETONES, URINE, POC: NEGATIVE
LEUKOCYTE ESTERASE, URINE, POC: NEGATIVE
NITRITE, URINE, POC: NEGATIVE
PH, URINE, POC: 6.5 (ref 4.6–8)
PROTEIN,URINE, POC: NEGATIVE
SPECIFIC GRAVITY, URINE, POC: 1.01 (ref 1–1.03)
URINALYSIS CLARITY, POC: NORMAL
URINALYSIS COLOR, POC: NORMAL
UROBILINOGEN, POC: NORMAL

## 2024-03-27 PROCEDURE — 1036F TOBACCO NON-USER: CPT | Performed by: NURSE PRACTITIONER

## 2024-03-27 PROCEDURE — 1111F DSCHRG MED/CURRENT MED MERGE: CPT | Performed by: NURSE PRACTITIONER

## 2024-03-27 PROCEDURE — G8427 DOCREV CUR MEDS BY ELIG CLIN: HCPCS | Performed by: NURSE PRACTITIONER

## 2024-03-27 PROCEDURE — 1123F ACP DISCUSS/DSCN MKR DOCD: CPT | Performed by: NURSE PRACTITIONER

## 2024-03-27 PROCEDURE — G8484 FLU IMMUNIZE NO ADMIN: HCPCS | Performed by: NURSE PRACTITIONER

## 2024-03-27 PROCEDURE — G8417 CALC BMI ABV UP PARAM F/U: HCPCS | Performed by: NURSE PRACTITIONER

## 2024-03-27 PROCEDURE — 99203 OFFICE O/P NEW LOW 30 MIN: CPT | Performed by: NURSE PRACTITIONER

## 2024-03-27 PROCEDURE — 81003 URINALYSIS AUTO W/O SCOPE: CPT | Performed by: NURSE PRACTITIONER

## 2024-03-27 ASSESSMENT — ENCOUNTER SYMPTOMS
BLOOD IN STOOL: 1
NAUSEA: 0
EYE DISCHARGE: 0
COUGH: 0
EYE PAIN: 0
SHORTNESS OF BREATH: 0
DIARRHEA: 0
ABDOMINAL PAIN: 0
VOMITING: 0
BACK PAIN: 0
HEARTBURN: 0
SKIN LESIONS: 0
CONSTIPATION: 0
WHEEZING: 0
INDIGESTION: 0

## 2024-03-27 NOTE — PROGRESS NOTES
mouth daily      metoprolol tartrate (LOPRESSOR) 25 MG tablet Take 1 tablet by mouth 2 times daily      cycloSPORINE, PF, (CEQUA) 0.09 % SOLN Apply 1 drop to eye in the morning and at bedtime Both Eyes      sacubitril-valsartan (ENTRESTO) 49-51 MG per tablet Take 1 tablet by mouth 2 times daily      furosemide (LASIX) 40 MG tablet Take 1 tablet by mouth 2 times daily      insulin aspart (NOVOLOG) 100 UNIT/ML injection pen Inject 20 Units into the skin      insulin glargine (LANTUS) 100 UNIT/ML injection vial Inject 30 Units into the skin 2 times daily 30 U in AM & 30 U at night      montelukast (SINGULAIR) 10 MG tablet Take 1 tablet by mouth as needed (Allergies)       No current facility-administered medications for this visit.     No Known Allergies  Social History     Socioeconomic History    Marital status:      Spouse name: Not on file    Number of children: Not on file    Years of education: Not on file    Highest education level: Not on file   Occupational History    Not on file   Tobacco Use    Smoking status: Never    Smokeless tobacco: Never   Vaping Use    Vaping Use: Never used   Substance and Sexual Activity    Alcohol use: Not Currently    Drug use: Never    Sexual activity: Defer   Other Topics Concern    Not on file   Social History Narrative    Not on file     Social Determinants of Health     Financial Resource Strain: Not on file   Food Insecurity: No Food Insecurity (3/16/2024)    Hunger Vital Sign     Worried About Running Out of Food in the Last Year: Never true     Ran Out of Food in the Last Year: Never true   Transportation Needs: No Transportation Needs (3/16/2024)    PRAPARE - Transportation     Lack of Transportation (Medical): No     Lack of Transportation (Non-Medical): No   Physical Activity: Not on file   Stress: Not on file   Social Connections: Not on file   Intimate Partner Violence: Not on file   Housing Stability: Low Risk  (3/16/2024)    Housing Stability Vital Sign

## 2024-04-16 ENCOUNTER — TELEPHONE (OUTPATIENT)
Age: 84
End: 2024-04-16

## 2024-04-16 ENCOUNTER — PROCEDURE VISIT (OUTPATIENT)
Dept: UROLOGY | Age: 84
End: 2024-04-16
Payer: MEDICARE

## 2024-04-16 DIAGNOSIS — R31.0 GROSS HEMATURIA: Primary | ICD-10-CM

## 2024-04-16 LAB
BILIRUBIN, URINE, POC: NEGATIVE
BLOOD URINE, POC: NEGATIVE
GLUCOSE URINE, POC: 500
KETONES, URINE, POC: NEGATIVE
LEUKOCYTE ESTERASE, URINE, POC: NEGATIVE
NITRITE, URINE, POC: NEGATIVE
PH, URINE, POC: 5.5 (ref 4.6–8)
PROTEIN,URINE, POC: NEGATIVE
SPECIFIC GRAVITY, URINE, POC: 1.01 (ref 1–1.03)
URINALYSIS CLARITY, POC: NORMAL
URINALYSIS COLOR, POC: NORMAL
UROBILINOGEN, POC: NORMAL

## 2024-04-16 PROCEDURE — 52000 CYSTOURETHROSCOPY: CPT | Performed by: UROLOGY

## 2024-04-16 PROCEDURE — 81003 URINALYSIS AUTO W/O SCOPE: CPT | Performed by: UROLOGY

## 2024-04-16 NOTE — TELEPHONE ENCOUNTER
CRISTINA Reaves, returned call. She states that patient has dizziness with positional change. B/p low, has cut the entresto in half per recommendations of nephrologist.  Would like for Dr. Triplett to address at appointment tomorrow.   Jelly informed that Dr. Triplett will be sent this message as FYI for tomorrow. Jelly voiced understanding and thanked me//kena    ----- Message from Conner Miguel to Douglas Triplett III, MD sent at 4/16/2024  1:27 PM -----   My PEGGY saw the nephrologists this morning where his BP was on a low side 102/51, he has occasional dizziness. He was also seen by PCP on the same day and BP was 88/48. So, he was advised to cut entresto in half twice daily by nephrologist. Please address this during visit tomorrow.  Thanks,  Jelly

## 2024-04-16 NOTE — PROGRESS NOTES
[facility]  [address]   [address]  [place] [address]  [phone]    Conner Miguel  : 1940    Chief Complaint   Patient presents with    Procedure     cysto          HPI     Conner Miguel is a 83 y.o. male referred for gross hematuria. Recent episode of gross hematuria as well as blood in stool. He is on Plavix. This was held by cardiologist. The hematuria resolved. No h/o hematuria. No h/o renal stones or UTI. No personal or family h/o urological CA. Non-smoker. Accompanied by family member today.     CT stone protocol 24 revealed only prostatomegaly from  perspective.       Past Medical History:   Diagnosis Date    CAD (coronary artery disease)     CHF (congestive heart failure) (HCC)     Chronic kidney disease     Diabetes mellitus (HCC)     Hypertension     Thyroid disease      Past Surgical History:   Procedure Laterality Date    CORONARY ANGIOPLASTY WITH STENT PLACEMENT      2    CORONARY ARTERY BYPASS GRAFT       Current Outpatient Medications   Medication Sig Dispense Refill    empagliflozin (JARDIANCE) 10 MG tablet Take 1 tablet by mouth daily 30 tablet 3    nitroGLYCERIN (NITROSTAT) 0.3 MG SL tablet Place 1 tablet under the tongue every 5 minutes as needed for Chest pain up to max of 3 total doses. If no relief after 1 dose, call 911. 30 tablet 3    allopurinol (ZYLOPRIM) 100 MG tablet Take 1 tablet by mouth daily 30 tablet 3    clopidogrel (PLAVIX) 75 MG tablet Take 1 tablet by mouth daily 30 tablet 3    tamsulosin (FLOMAX) 0.4 MG capsule Take 1 capsule by mouth daily 30 capsule 3    terazosin (HYTRIN) 5 MG capsule Take 1 capsule by mouth nightly 30 capsule 3    ferrous sulfate (IRON 325) 325 (65 Fe) MG tablet Take 1 tablet by mouth in the morning and at bedtime      sodium bicarbonate 650 MG tablet Take 1 tablet by mouth 2 times daily      levothyroxine (SYNTHROID) 88 MCG tablet Take 1 tablet by mouth Daily      Fe Fum-FA-B Buu-S-Jv-Mg-Mn-Cu (CENTRATEX)

## 2024-04-17 ENCOUNTER — OFFICE VISIT (OUTPATIENT)
Age: 84
End: 2024-04-17
Payer: MEDICARE

## 2024-04-17 VITALS
BODY MASS INDEX: 33.75 KG/M2 | WEIGHT: 183.4 LBS | DIASTOLIC BLOOD PRESSURE: 62 MMHG | HEIGHT: 62 IN | SYSTOLIC BLOOD PRESSURE: 100 MMHG | HEART RATE: 62 BPM

## 2024-04-17 DIAGNOSIS — N18.30 STAGE 3 CHRONIC KIDNEY DISEASE, UNSPECIFIED WHETHER STAGE 3A OR 3B CKD (HCC): ICD-10-CM

## 2024-04-17 DIAGNOSIS — I10 ESSENTIAL HYPERTENSION: ICD-10-CM

## 2024-04-17 DIAGNOSIS — I50.32 CHRONIC HEART FAILURE WITH PRESERVED EJECTION FRACTION (HCC): ICD-10-CM

## 2024-04-17 DIAGNOSIS — I25.10 CORONARY ARTERY DISEASE INVOLVING NATIVE CORONARY ARTERY OF NATIVE HEART WITHOUT ANGINA PECTORIS: Primary | ICD-10-CM

## 2024-04-17 PROCEDURE — G8427 DOCREV CUR MEDS BY ELIG CLIN: HCPCS | Performed by: INTERNAL MEDICINE

## 2024-04-17 PROCEDURE — G8417 CALC BMI ABV UP PARAM F/U: HCPCS | Performed by: INTERNAL MEDICINE

## 2024-04-17 PROCEDURE — 1123F ACP DISCUSS/DSCN MKR DOCD: CPT | Performed by: INTERNAL MEDICINE

## 2024-04-17 PROCEDURE — 1111F DSCHRG MED/CURRENT MED MERGE: CPT | Performed by: INTERNAL MEDICINE

## 2024-04-17 PROCEDURE — 99214 OFFICE O/P EST MOD 30 MIN: CPT | Performed by: INTERNAL MEDICINE

## 2024-04-17 PROCEDURE — 3074F SYST BP LT 130 MM HG: CPT | Performed by: INTERNAL MEDICINE

## 2024-04-17 PROCEDURE — 1036F TOBACCO NON-USER: CPT | Performed by: INTERNAL MEDICINE

## 2024-04-17 PROCEDURE — 3078F DIAST BP <80 MM HG: CPT | Performed by: INTERNAL MEDICINE

## 2024-04-17 RX ORDER — POTASSIUM CITRATE 10 MEQ/1
10 TABLET, EXTENDED RELEASE ORAL DAILY
COMMUNITY

## 2024-04-17 ASSESSMENT — ENCOUNTER SYMPTOMS
SHORTNESS OF BREATH: 0
ABDOMINAL PAIN: 0

## 2024-04-17 NOTE — PROGRESS NOTES
fever.   HENT:  Negative for hearing loss.    Eyes:  Negative for visual disturbance.   Cardiovascular:         As per the HPI   Respiratory:  Negative for shortness of breath.    Hematologic/Lymphatic: Does not bruise/bleed easily.   Gastrointestinal:  Negative for abdominal pain.   Genitourinary:  Negative for dysuria.   Neurological:  Negative for focal weakness.   Psychiatric/Behavioral:  Negative for suicidal ideas.           PHYSICAL EXAM:   /62   Pulse 62   Ht 1.575 m (5' 2\")   Wt 83.2 kg (183 lb 6.4 oz)   BMI 33.54 kg/m²      Wt Readings from Last 3 Encounters:   04/17/24 83.2 kg (183 lb 6.4 oz)   03/21/24 81.6 kg (180 lb)   03/20/24 81.7 kg (180 lb 3.2 oz)     BP Readings from Last 3 Encounters:   04/17/24 100/62   03/21/24 132/72   03/20/24 (!) 121/55     Pulse Readings from Last 3 Encounters:   04/17/24 62   03/21/24 84   03/20/24 62           Physical Exam  Vitals reviewed.   Constitutional:       General: He is not in acute distress.     Appearance: Normal appearance.   HENT:      Head: Normocephalic and atraumatic.   Eyes:      General: No scleral icterus.  Neck:      Vascular: No carotid bruit.   Cardiovascular:      Rate and Rhythm: Normal rate and regular rhythm.      Heart sounds: No murmur heard.  Pulmonary:      Breath sounds: Normal breath sounds.   Abdominal:      General: Abdomen is flat.      Palpations: Abdomen is soft.   Musculoskeletal:      Cervical back: Neck supple.      Right lower leg: Edema present.      Left lower leg: Edema present.   Skin:     General: Skin is warm and dry.   Neurological:      Mental Status: He is alert and oriented to person, place, and time.   Psychiatric:         Mood and Affect: Mood normal.         Medical problems and test results were reviewed with the patient today.     DATA REVIEW    LIPID PANEL     No results found for: \"CHOL\"  No results found for: \"TRIG\"  No results found for: \"HDL\"  No results found for: \"LDLCHOLESTEROL\", \"LDLCALC\"  No 
I will START or STAY ON the medications listed below when I get home from the hospital:    dexamethasone 4 mg oral tablet  -- 1 tab(s) by mouth 3 times a day (with meals) until 8/9/17  -- Indication: For Glioblastoma    dexamethasone 4 mg oral tablet  -- 1 tab(s) by mouth 2 times a day beginning 8/10/17  -- It is very important that you take or use this exactly as directed.  Do not skip doses or discontinue unless directed by your doctor.  Obtain medical advice before taking any non-prescription drugs as some may affect the action of this medication.  Take with food or milk.    -- Indication: For Glioblastoma    levETIRAcetam 500 mg oral tablet  -- 1 tab(s) by mouth 2 times a day  -- Indication: For Glioblastoma    pantoprazole 40 mg oral delayed release tablet  -- 1 tab(s) by mouth once a day (before a meal)  -- Indication: For Gerd

## 2024-06-10 ENCOUNTER — HOME HEALTH ADMISSION (OUTPATIENT)
Dept: HOME HEALTH SERVICES | Facility: HOME HEALTH | Age: 84
End: 2024-06-10

## 2024-06-18 ENCOUNTER — OFFICE VISIT (OUTPATIENT)
Age: 84
End: 2024-06-18
Payer: MEDICARE

## 2024-06-18 VITALS
SYSTOLIC BLOOD PRESSURE: 112 MMHG | HEIGHT: 63 IN | BODY MASS INDEX: 32.14 KG/M2 | DIASTOLIC BLOOD PRESSURE: 54 MMHG | HEART RATE: 68 BPM | WEIGHT: 181.4 LBS

## 2024-06-18 DIAGNOSIS — I50.32 CHRONIC HEART FAILURE WITH PRESERVED EJECTION FRACTION (HCC): Primary | ICD-10-CM

## 2024-06-18 DIAGNOSIS — N18.30 STAGE 3 CHRONIC KIDNEY DISEASE, UNSPECIFIED WHETHER STAGE 3A OR 3B CKD (HCC): ICD-10-CM

## 2024-06-18 DIAGNOSIS — I10 PRIMARY HYPERTENSION: ICD-10-CM

## 2024-06-18 DIAGNOSIS — I25.10 ATHEROSCLEROSIS OF NATIVE CORONARY ARTERY OF NATIVE HEART WITHOUT ANGINA PECTORIS: ICD-10-CM

## 2024-06-18 DIAGNOSIS — I50.32 CHRONIC HEART FAILURE WITH PRESERVED EJECTION FRACTION (HCC): ICD-10-CM

## 2024-06-18 LAB
ALBUMIN SERPL-MCNC: 2.9 G/DL (ref 3.2–4.6)
ALBUMIN/GLOB SERPL: 0.8 (ref 1–1.9)
ALP SERPL-CCNC: 106 U/L (ref 40–129)
ALT SERPL-CCNC: 23 U/L (ref 12–65)
ANION GAP SERPL CALC-SCNC: 15 MMOL/L (ref 9–18)
AST SERPL-CCNC: 23 U/L (ref 15–37)
BILIRUB SERPL-MCNC: 0.4 MG/DL (ref 0–1.2)
BUN SERPL-MCNC: 40 MG/DL (ref 8–23)
CALCIUM SERPL-MCNC: 8.7 MG/DL (ref 8.8–10.2)
CHLORIDE SERPL-SCNC: 100 MMOL/L (ref 98–107)
CO2 SERPL-SCNC: 25 MMOL/L (ref 20–28)
CREAT SERPL-MCNC: 2.23 MG/DL (ref 0.8–1.3)
GLOBULIN SER CALC-MCNC: 3.4 G/DL (ref 2.3–3.5)
GLUCOSE SERPL-MCNC: 301 MG/DL (ref 70–99)
POTASSIUM SERPL-SCNC: 4.1 MMOL/L (ref 3.5–5.1)
PROT SERPL-MCNC: 6.3 G/DL (ref 6.3–8.2)
SODIUM SERPL-SCNC: 139 MMOL/L (ref 136–145)

## 2024-06-18 PROCEDURE — 1036F TOBACCO NON-USER: CPT | Performed by: INTERNAL MEDICINE

## 2024-06-18 PROCEDURE — 99214 OFFICE O/P EST MOD 30 MIN: CPT | Performed by: INTERNAL MEDICINE

## 2024-06-18 PROCEDURE — G8428 CUR MEDS NOT DOCUMENT: HCPCS | Performed by: INTERNAL MEDICINE

## 2024-06-18 PROCEDURE — 3078F DIAST BP <80 MM HG: CPT | Performed by: INTERNAL MEDICINE

## 2024-06-18 PROCEDURE — G8417 CALC BMI ABV UP PARAM F/U: HCPCS | Performed by: INTERNAL MEDICINE

## 2024-06-18 PROCEDURE — 1123F ACP DISCUSS/DSCN MKR DOCD: CPT | Performed by: INTERNAL MEDICINE

## 2024-06-18 PROCEDURE — 3074F SYST BP LT 130 MM HG: CPT | Performed by: INTERNAL MEDICINE

## 2024-06-18 RX ORDER — FUROSEMIDE 40 MG/1
80 TABLET ORAL SEE ADMIN INSTRUCTIONS
Qty: 90 TABLET | Refills: 3 | Status: SHIPPED | OUTPATIENT
Start: 2024-06-18 | End: 2024-06-18

## 2024-06-18 RX ORDER — FUROSEMIDE 40 MG/1
80 TABLET ORAL SEE ADMIN INSTRUCTIONS
Qty: 90 TABLET | Refills: 3 | Status: SHIPPED | OUTPATIENT
Start: 2024-06-18

## 2024-06-18 ASSESSMENT — ENCOUNTER SYMPTOMS
SHORTNESS OF BREATH: 1
ABDOMINAL PAIN: 0

## 2024-06-18 NOTE — PROGRESS NOTES
[facility] CARDIOLOGY  [address] [place] [address], [address]  [place] [address]  PHONE: [phone]      24    NAME:  Conner Miguel  : 1940  MRN: [id number]         SUBJECTIVE:   Conner Miguel is a 83 y.o. male seen for a follow up visit regarding the following:     Chief Complaint   Patient presents with    Coronary Artery Disease            HPI:  Follow up  Coronary Artery Disease   .    Mr. Miguel presents today for follow-up.  Patient was last seen by me 2 months ago after hospitalization for decompensated heart failure preserved ejection fraction.  Over the last couple of weeks, patient has noted increasing swelling of his lower extremities.  His daughter reports he had some blistering and weeping of his lower extremities.  Was recently treated for gout and since that treatment began his symptoms have been worse.  He does get breathless with activity.  Reports compliance medications including his Lasix therapy.  Has orthopnea, sleeps on multiple pillows.  His weight is more or less unchanged from my last visit with him in April    Coronary Artery Disease  Symptoms include shortness of breath.           Cardiac Medications       Antiadrenergic Antihypertensives       terazosin (HYTRIN) 5 MG capsule Take 1 capsule by mouth nightly       Nitrates       nitroGLYCERIN (NITROSTAT) 0.3 MG SL tablet Place 1 tablet under the tongue every 5 minutes as needed for Chest pain up to max of 3 total doses. If no relief after 1 dose, call 911.       Beta Blockers Cardio-Selective       metoprolol tartrate (LOPRESSOR) 25 MG tablet Take 1 tablet by mouth 2 times daily       Loop Diuretics       furosemide (LASIX) 40 MG tablet Take 2 tablets by mouth See Admin Instructions Take 2 tablets in the am Take 1 tablet in the pm       HMG CoA Reductase Inhibitors       atorvastatin (LIPITOR) 80 MG tablet Take 1 tablet by mouth daily       Cardiovascular Agents Misc. - Combinations       sacubitril-valsartan (ENTRESTO)

## 2024-07-23 ENCOUNTER — HOSPITAL ENCOUNTER (OUTPATIENT)
Dept: WOUND CARE | Age: 84
Discharge: HOME | End: 2024-07-23
Payer: MEDICARE

## 2024-07-23 VITALS
HEART RATE: 62 BPM | OXYGEN SATURATION: 100 % | HEIGHT: 63 IN | WEIGHT: 180 LBS | SYSTOLIC BLOOD PRESSURE: 116 MMHG | DIASTOLIC BLOOD PRESSURE: 47 MMHG | TEMPERATURE: 98 F | BODY MASS INDEX: 31.89 KG/M2 | RESPIRATION RATE: 16 BRPM

## 2024-07-23 DIAGNOSIS — L97.921 CHRONIC ULCER OF LEFT LEG, LIMITED TO BREAKDOWN OF SKIN (HCC): ICD-10-CM

## 2024-07-23 DIAGNOSIS — L97.515 DIABETIC ULCER OF TOE OF RIGHT FOOT ASSOCIATED WITH TYPE 2 DIABETES MELLITUS, WITH MUSCLE INVOLVEMENT WITHOUT EVIDENCE OF NECROSIS (HCC): Primary | ICD-10-CM

## 2024-07-23 DIAGNOSIS — E11.621 DIABETIC ULCER OF TOE OF RIGHT FOOT ASSOCIATED WITH TYPE 2 DIABETES MELLITUS, WITH MUSCLE INVOLVEMENT WITHOUT EVIDENCE OF NECROSIS (HCC): Primary | ICD-10-CM

## 2024-07-23 PROBLEM — Z79.4: Chronic | Status: ACTIVE | Noted: 2024-03-16

## 2024-07-23 PROBLEM — E11.21: Chronic | Status: ACTIVE | Noted: 2024-03-16

## 2024-07-23 PROCEDURE — 99213 OFFICE O/P EST LOW 20 MIN: CPT

## 2024-07-23 PROCEDURE — 99203 OFFICE O/P NEW LOW 30 MIN: CPT | Performed by: FAMILY MEDICINE

## 2024-07-23 RX ORDER — IBUPROFEN 200 MG
TABLET ORAL ONCE
OUTPATIENT
Start: 2024-07-23 | End: 2024-07-23

## 2024-07-23 RX ORDER — LIDOCAINE HYDROCHLORIDE 40 MG/ML
SOLUTION TOPICAL ONCE
OUTPATIENT
Start: 2024-07-23 | End: 2024-07-23

## 2024-07-23 RX ORDER — CEPHALEXIN 500 MG/1
500 CAPSULE ORAL 2 TIMES DAILY
COMMUNITY

## 2024-07-23 RX ORDER — BETAMETHASONE DIPROPIONATE 0.5 MG/G
CREAM TOPICAL ONCE
OUTPATIENT
Start: 2024-07-23 | End: 2024-07-23

## 2024-07-23 RX ORDER — LIDOCAINE 50 MG/G
OINTMENT TOPICAL ONCE
OUTPATIENT
Start: 2024-07-23 | End: 2024-07-23

## 2024-07-23 RX ORDER — GENTAMICIN SULFATE 1 MG/G
OINTMENT TOPICAL ONCE
OUTPATIENT
Start: 2024-07-23 | End: 2024-07-23

## 2024-07-23 RX ORDER — TRIAMCINOLONE ACETONIDE 1 MG/G
OINTMENT TOPICAL ONCE
OUTPATIENT
Start: 2024-07-23 | End: 2024-07-23

## 2024-07-23 RX ORDER — LIDOCAINE HYDROCHLORIDE 20 MG/ML
JELLY TOPICAL ONCE
OUTPATIENT
Start: 2024-07-23 | End: 2024-07-23

## 2024-07-23 RX ORDER — SODIUM CHLOR/HYPOCHLOROUS ACID 0.033 %
SOLUTION, IRRIGATION IRRIGATION ONCE
OUTPATIENT
Start: 2024-07-23 | End: 2024-07-23

## 2024-07-23 RX ORDER — CLOBETASOL PROPIONATE 0.5 MG/G
OINTMENT TOPICAL ONCE
OUTPATIENT
Start: 2024-07-23 | End: 2024-07-23

## 2024-07-23 RX ORDER — GINSENG 100 MG
CAPSULE ORAL ONCE
OUTPATIENT
Start: 2024-07-23 | End: 2024-07-23

## 2024-07-23 RX ORDER — LIDOCAINE 40 MG/G
CREAM TOPICAL ONCE
OUTPATIENT
Start: 2024-07-23 | End: 2024-07-23

## 2024-07-23 RX ORDER — TRAMADOL HYDROCHLORIDE 50 MG/1
50 TABLET ORAL EVERY 6 HOURS PRN
Qty: 28 TABLET | Refills: 0 | Status: SHIPPED | OUTPATIENT
Start: 2024-07-23 | End: 2024-07-30

## 2024-07-23 RX ORDER — BACITRACIN ZINC AND POLYMYXIN B SULFATE 500; 1000 [USP'U]/G; [USP'U]/G
OINTMENT TOPICAL ONCE
OUTPATIENT
Start: 2024-07-23 | End: 2024-07-23

## 2024-07-23 NOTE — WOUND CARE
Discharge Instructions for  [facility]  [address]  [address]  [place] [address]  Phone [phone]   Fax [phone]      NAME:  Conner Miguel  YOB: 1940  MEDICAL RECORD NUMBER:  [id number]  DATE:  7/23/2024    Return Appointment:   1 week with Yury Barrera DO      Instructions:   Right 2nd Toe:  Cleanse wound with normal saline or VASHE.   Apply Xeroform gauze to right toe.  Cover with gauze  Apply tubigrip stocking to bilateral legs.   Change daily; Home Health to change 2 times a week, wound center to change 1 time a week, and family to change other days.    Left Lower Leg:  Cleanse wound with normal saline or VASHE.  Apply Xeroform gauze  Cover with bordered foam  Apply tubigrip stocking to lower leg.   Change daily Home Health to change 2 times a week, wound center to change 1 time a week, and family to change other days.    Patient to offload wound with  open toe shoe  while standing or weight bearing.    Do not get wound wet. May purchase cast cover at local pharmacy to keep dry in shower.  Wound healing is greatly slowed when blood glucose levels are greater than 200. Monitor glucose levels daily to ensure tight glucose control.  Follow up with PCP if your glucose levels are frequently greater than 200.  Increase protein intake to promote wound healing. Protein supplements such as Jarocho and Ensure are great options.  X-Ray ordered today. You do not need an appointment for this test. You may go directly to outpatient radiology.  ABIs (Arterial Brachial Index study) ordered per MD through [facility]. Expect a call to schedule test or call [phone] to initiate scheduling.     **Continue antibiotic until finished  **Tramadol pain medication ordered 7/23/24 sent to pharmacy    Should you experience increased redness, swelling, pain, foul odor, size of wound(s), or have a temperature over 101 degrees please contact the Wound Healing

## 2024-07-23 NOTE — DISCHARGE INSTRUCTIONS
Right 2nd Toe:  Cleanse wound with normal saline or VASHE.   Apply Xeroform gauze to right toe.  Cover with gauze  Apply tubigrip stocking to bilateral legs.   Change daily by home health for a week    Left Lower Leg:  Cleanse wound with normal saline or VASHE.  Apply   Cover with   Apply tubigrip stocking to lower leg.   Change daily by home health for a week.    Patient to offload wound with open toe shoe while standing or weight bearing.    Do not get wound wet. May purchase cast cover at local pharmacy to keep dry in shower.  Wound healing is greatly slowed when blood glucose levels are greater than 200. Monitor glucose levels daily to ensure tight glucose control.  Follow up with PCP if your glucose levels are frequently greater than 200.  Increase protein intake to promote wound healing. Protein supplements such as Jarocho and Ensure are great options.  X-Ray ordered today. You do not need an appointment for this test. You may go directly to outpatient radiology.  ABIs (Arterial Brachial Index study) ordered per MD through [facility]. Expect a call to schedule test or call [phone] to initiate scheduling.     **Continue antibiotic until finished  **Tramadol pain medication ordered 7/23/24 sent to pharmacy

## 2024-07-23 NOTE — PROGRESS NOTES
[facility]   History and Physical Note   Referring Provider:    Nick Smith MD     Reason for Referral: Necrotic second toe right foot and small venous ulcer left leg    Conner Miguel  MEDICAL RECORD NUMBER:  [id number]  AGE: 83 y.o.   GENDER: male  : 1940  EPISODE DATE:  2024    Chief complaint and reason for visit:     Chief Complaint   Patient presents with    Wound Check     Foot wounds         HISTORY of PRESENT ILLNESS HPI     Conner Miguel is a 83 y.o. male who presents today for an initial evaluation of a wound/ulcer. Patient is new to the wound center. Wound duration:  6 week(s).    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  This area is necrotic.  He says initially started as a blister along with dropping a can on his toe.  Recently been on antibiotics.  Currently using Betadine on the wound.  He also has a small area on the left shin where he has some lower extremity edema.  Pertinent associated symptoms: drainage , swelling, and skin discoloration    PAST MEDICAL HISTORY        Diagnosis Date    CAD (coronary artery disease)     CHF (congestive heart failure) (HCC)     Chronic kidney disease     Diabetes mellitus (HCC)     Hypertension     Thyroid disease        PAST SURGICAL HISTORY  Past Surgical History:   Procedure Laterality Date    CORONARY ANGIOPLASTY WITH STENT PLACEMENT      2    CORONARY ARTERY BYPASS GRAFT         FAMILY HISTORY  History reviewed. No pertinent family history.    SOCIAL HISTORY  Social History     Tobacco Use    Smoking status: Never    Smokeless tobacco: Never   Vaping Use    Vaping Use: Never used   Substance Use Topics    Alcohol use: Never    Drug use: Never       ALLERGIES  No Known Allergies    MEDICATIONS  Current Outpatient Medications on File Prior to Encounter   Medication Sig Dispense Refill    cephALEXin (KEFLEX) 500 MG capsule Take 1 capsule by

## 2024-07-23 NOTE — FLOWSHEET NOTE
07/23/24 1425   Right Leg Edema Point of Measurement   Leg circumference 35 cm   Ankle circumference 23 cm   Foot circumference 23 cm   Compression Therapy Compression not ordered   Left Leg Edema Point of Measurement   Leg circumference 37 cm   Ankle circumference 25 cm   Foot circumference 25 cm   Compression Therapy Compression not ordered   RLE Neurovascular Assessment   R Post Tibial Pulse +1 (Weak)   R Pedal Pulse +1   R Calf Tenderness  Negative   Claudication Assessment None   LLE Neurovascular Assessment   L Post Tibial Pulse +1 (Weak)   L Pedal Pulse +1   L Calf Tenderness Negative   Claudication Assessment None   Wound 07/23/24 Toe (Comment  which one) Right #1  2nd Toe   Date First Assessed/Time First Assessed: 07/23/24 1424   Present on Original Admission: Yes  Wound Approximate Age at First Assessment (Weeks): 3 weeks  Primary Wound Type: Diabetic Ulcer  Location: Toe (Comment  which one)  Wound Location Orientation...   Wound Image    Wound Etiology Diabetic Tee 2   Dressing Status Dry   Wound Cleansed Cleansed with saline   Dressing/Treatment Open to air   Offloading for Diabetic Foot Ulcers Offloading ordered   Wound Length (cm) 1.5 cm   Wound Width (cm) 1.5 cm   Wound Depth (cm) 0.1 cm   Wound Surface Area (cm^2) 2.25 cm^2   Wound Volume (cm^3) 0.225 cm^3   Wound Assessment Eschar dry   Drainage Amount None (dry)   Renetta-wound Assessment Edematous;Blanchable erythema   Wound Thickness Description not for Pressure Injury Full thickness   Wound 07/23/24 Leg Left;Anterior;Lower #2   Date First Assessed/Time First Assessed: 07/23/24 1426   Present on Original Admission: Yes  Wound Approximate Age at First Assessment (Weeks): 2 weeks  Primary Wound Type: Venous Ulcer  Location: Leg  Wound Location Orientation: Left;Anterior;Lower  ...   Wound Image    Wound Etiology Venous   Dressing Status Other (Comment)   Wound Cleansed Cleansed with saline   Dressing/Treatment Open to air   Wound Length (cm) 2

## 2024-07-25 ENCOUNTER — HOSPITAL ENCOUNTER (OUTPATIENT)
Dept: GENERAL RADIOLOGY | Age: 84
Discharge: HOME | End: 2024-07-25
Payer: MEDICARE

## 2024-07-25 DIAGNOSIS — L97.921 CHRONIC ULCER OF LEFT LEG, LIMITED TO BREAKDOWN OF SKIN (HCC): ICD-10-CM

## 2024-07-25 DIAGNOSIS — L97.515 DIABETIC ULCER OF TOE OF RIGHT FOOT ASSOCIATED WITH TYPE 2 DIABETES MELLITUS, WITH MUSCLE INVOLVEMENT WITHOUT EVIDENCE OF NECROSIS (HCC): ICD-10-CM

## 2024-07-25 DIAGNOSIS — E11.621 DIABETIC ULCER OF TOE OF RIGHT FOOT ASSOCIATED WITH TYPE 2 DIABETES MELLITUS, WITH MUSCLE INVOLVEMENT WITHOUT EVIDENCE OF NECROSIS (HCC): ICD-10-CM

## 2024-07-25 PROCEDURE — 73630 X-RAY EXAM OF FOOT: CPT

## 2024-07-30 ENCOUNTER — HOSPITAL ENCOUNTER (OUTPATIENT)
Dept: WOUND CARE | Age: 84
Discharge: HOME | End: 2024-07-30
Payer: MEDICARE

## 2024-07-30 VITALS
DIASTOLIC BLOOD PRESSURE: 53 MMHG | HEIGHT: 63 IN | SYSTOLIC BLOOD PRESSURE: 122 MMHG | RESPIRATION RATE: 18 BRPM | TEMPERATURE: 98.1 F | BODY MASS INDEX: 32.25 KG/M2 | WEIGHT: 182 LBS | OXYGEN SATURATION: 99 % | HEART RATE: 64 BPM

## 2024-07-30 DIAGNOSIS — L97.515 DIABETIC ULCER OF TOE OF RIGHT FOOT ASSOCIATED WITH TYPE 2 DIABETES MELLITUS, WITH MUSCLE INVOLVEMENT WITHOUT EVIDENCE OF NECROSIS (HCC): Primary | ICD-10-CM

## 2024-07-30 DIAGNOSIS — E11.621 DIABETIC ULCER OF TOE OF RIGHT FOOT ASSOCIATED WITH TYPE 2 DIABETES MELLITUS, WITH MUSCLE INVOLVEMENT WITHOUT EVIDENCE OF NECROSIS (HCC): Primary | ICD-10-CM

## 2024-07-30 PROCEDURE — 99213 OFFICE O/P EST LOW 20 MIN: CPT

## 2024-07-30 RX ORDER — LIDOCAINE HYDROCHLORIDE 20 MG/ML
JELLY TOPICAL ONCE
OUTPATIENT
Start: 2024-07-30 | End: 2024-07-30

## 2024-07-30 RX ORDER — LIDOCAINE 50 MG/G
OINTMENT TOPICAL ONCE
OUTPATIENT
Start: 2024-07-30 | End: 2024-07-30

## 2024-07-30 RX ORDER — GENTAMICIN SULFATE 1 MG/G
OINTMENT TOPICAL ONCE
OUTPATIENT
Start: 2024-07-30 | End: 2024-07-30

## 2024-07-30 RX ORDER — SODIUM CHLOR/HYPOCHLOROUS ACID 0.033 %
SOLUTION, IRRIGATION IRRIGATION ONCE
OUTPATIENT
Start: 2024-07-30 | End: 2024-07-30

## 2024-07-30 RX ORDER — LIDOCAINE HYDROCHLORIDE 40 MG/ML
SOLUTION TOPICAL ONCE
OUTPATIENT
Start: 2024-07-30 | End: 2024-07-30

## 2024-07-30 RX ORDER — BETAMETHASONE DIPROPIONATE 0.5 MG/G
CREAM TOPICAL ONCE
OUTPATIENT
Start: 2024-07-30 | End: 2024-07-30

## 2024-07-30 RX ORDER — TRIAMCINOLONE ACETONIDE 1 MG/G
OINTMENT TOPICAL ONCE
OUTPATIENT
Start: 2024-07-30 | End: 2024-07-30

## 2024-07-30 RX ORDER — BACITRACIN ZINC AND POLYMYXIN B SULFATE 500; 1000 [USP'U]/G; [USP'U]/G
OINTMENT TOPICAL ONCE
OUTPATIENT
Start: 2024-07-30 | End: 2024-07-30

## 2024-07-30 RX ORDER — LIDOCAINE HYDROCHLORIDE 20 MG/ML
JELLY TOPICAL ONCE
Status: COMPLETED | OUTPATIENT
Start: 2024-07-30 | End: 2024-07-30

## 2024-07-30 RX ORDER — CLOBETASOL PROPIONATE 0.5 MG/G
OINTMENT TOPICAL ONCE
OUTPATIENT
Start: 2024-07-30 | End: 2024-07-30

## 2024-07-30 RX ORDER — GINSENG 100 MG
CAPSULE ORAL ONCE
OUTPATIENT
Start: 2024-07-30 | End: 2024-07-30

## 2024-07-30 RX ORDER — LIDOCAINE 40 MG/G
CREAM TOPICAL ONCE
OUTPATIENT
Start: 2024-07-30 | End: 2024-07-30

## 2024-07-30 RX ORDER — IBUPROFEN 200 MG
TABLET ORAL ONCE
OUTPATIENT
Start: 2024-07-30 | End: 2024-07-30

## 2024-07-30 RX ADMIN — LIDOCAINE HYDROCHLORIDE: 20 JELLY TOPICAL at 13:23

## 2024-07-30 ASSESSMENT — PAIN SCALES - GENERAL: PAINLEVEL_OUTOF10: 6

## 2024-07-30 NOTE — WOUND CARE
Discharge Instructions for  [facility]  [address]  [address]  [place] [address]  Phone [phone]   Fax [phone]      NAME:  Conner Miguel  YOB: 1940  MEDICAL RECORD NUMBER:  [id number]  DATE:  7/30/2024    Return Appointment:   1 week with Yury Barrera DO      Instructions:   Right 2nd Toe:  Cleanse wound with normal saline or VASHE.   Apply Xeroform gauze to right toe.  Cover with gauze  Apply tubigrip stocking to bilateral legs.   Change daily; Home Health to change 2 times a week, wound center to change 1 time a week, and family to change other days.    Patient to offload wound with  open toe shoe  while standing or weight bearing.    **MRI ordered for right foot. MRI scheduling should be giving you a call to schedule.    Do not get wound wet. May purchase cast cover at local pharmacy to keep dry in shower.  Wound healing is greatly slowed when blood glucose levels are greater than 200. Monitor glucose levels daily to ensure tight glucose control.  Follow up with PCP if your glucose levels are frequently greater than 200.  Increase protein intake to promote wound healing. Protein supplements such as Jarocho and Ensure are great options.  ABIs (Arterial Brachial Index study) ordered per MD through [facility]. Expect a call to schedule test or call [phone] to initiate scheduling.      Should you experience increased redness, swelling, pain, foul odor, size of wound(s), or have a temperature over 101 degrees please contact the Wound Healing Center at [phone] or if after hours contact your primary care physician or go to the hospital emergency department.    PLEASE NOTE: IF YOU ARE UNABLE TO OBTAIN WOUND SUPPLIES, CONTINUE TO USE THE SUPPLIES YOU HAVE AVAILABLE UNTIL YOU ARE ABLE TO REACH US. IT IS MOST IMPORTANT TO KEEP THE WOUND COVERED AT ALL TIMES.    Electronically signed Bhupinder Valdivia RN on 7/30/2024 at 1:49 PM

## 2024-07-30 NOTE — FLOWSHEET NOTE
07/30/24 1309   [REMOVED] Wound 07/23/24 Leg Left;Anterior;Lower #2   Final Assessment Date/Final Assessment Time: 07/30/24 1322  Date First Assessed/Time First Assessed: 07/23/24 1426   Present on Original Admission: Yes  Wound Approximate Age at First Assessment (Weeks): 2 weeks  Primary Wound Type: Venous Ulcer  Loca...   Wound Image    Wound Etiology Venous   Dressing Status Other (Comment)  (santiago)   Wound Cleansed Not Cleansed   Dressing/Treatment Open to air   Wound Length (cm) 0 cm   Wound Width (cm) 0 cm   Wound Depth (cm) 0 cm   Wound Surface Area (cm^2) 0 cm^2   Change in Wound Size % (l*w) 100   Wound Volume (cm^3) 0 cm^3   Wound Healing % 100   Wound Assessment Pink/red   Drainage Amount None (dry)   Odor None   Renetta-wound Assessment Intact   Margins Undefined edges   Wound Thickness Description not for Pressure Injury Full thickness   Wound 07/23/24 Toe (Comment  which one) Right #1  2nd Toe   Date First Assessed/Time First Assessed: 07/23/24 1424   Present on Original Admission: Yes  Wound Approximate Age at First Assessment (Weeks): 3 weeks  Primary Wound Type: Diabetic Ulcer  Location: Toe (Comment  which one)  Wound Location Orientation...   Wound Image    Wound Etiology Diabetic Tee 2   Dressing Status Intact   Wound Cleansed Cleansed with saline   Dressing/Treatment Xeroform;Gauze dressing/dressing sponge   Wound Length (cm) 2 cm   Wound Width (cm) 1.5 cm   Wound Depth (cm) 0.1 cm   Wound Surface Area (cm^2) 3 cm^2   Change in Wound Size % (l*w) -33.33   Wound Volume (cm^3) 0.3 cm^3   Wound Healing % -33   Wound Assessment Eschar dry   Drainage Amount None (dry)   Odor None   Renetta-wound Assessment Edematous;Blanchable erythema   Margins Attached edges   Wound Thickness Description not for Pressure Injury Full thickness   Pain Assessment   Pain Assessment 0-10   Pain Level 6     Taking plavix

## 2024-08-05 ENCOUNTER — TELEPHONE (OUTPATIENT)
Dept: WOUND CARE | Age: 84
End: 2024-08-05

## 2024-08-05 NOTE — TELEPHONE ENCOUNTER
Magda called from Home Health and states patient's wound is darker in color and will be seeing patient on Wednesday.

## 2024-08-07 ENCOUNTER — HOSPITAL ENCOUNTER (OUTPATIENT)
Dept: WOUND CARE | Age: 84
Discharge: HOME | End: 2024-08-07
Payer: MEDICARE

## 2024-08-07 VITALS
DIASTOLIC BLOOD PRESSURE: 59 MMHG | RESPIRATION RATE: 18 BRPM | TEMPERATURE: 98.4 F | HEART RATE: 59 BPM | SYSTOLIC BLOOD PRESSURE: 112 MMHG | HEIGHT: 63 IN | OXYGEN SATURATION: 98 % | WEIGHT: 190 LBS | BODY MASS INDEX: 33.66 KG/M2

## 2024-08-07 DIAGNOSIS — L97.515 DIABETIC ULCER OF TOE OF RIGHT FOOT ASSOCIATED WITH TYPE 2 DIABETES MELLITUS, WITH MUSCLE INVOLVEMENT WITHOUT EVIDENCE OF NECROSIS (HCC): Primary | ICD-10-CM

## 2024-08-07 DIAGNOSIS — E11.621 DIABETIC ULCER OF TOE OF RIGHT FOOT ASSOCIATED WITH TYPE 2 DIABETES MELLITUS, WITH MUSCLE INVOLVEMENT WITHOUT EVIDENCE OF NECROSIS (HCC): Primary | ICD-10-CM

## 2024-08-07 PROCEDURE — 99212 OFFICE O/P EST SF 10 MIN: CPT

## 2024-08-07 RX ORDER — CLOBETASOL PROPIONATE 0.5 MG/G
OINTMENT TOPICAL ONCE
OUTPATIENT
Start: 2024-08-07 | End: 2024-08-07

## 2024-08-07 RX ORDER — LIDOCAINE 50 MG/G
OINTMENT TOPICAL ONCE
OUTPATIENT
Start: 2024-08-07 | End: 2024-08-07

## 2024-08-07 RX ORDER — GINSENG 100 MG
CAPSULE ORAL ONCE
OUTPATIENT
Start: 2024-08-07 | End: 2024-08-07

## 2024-08-07 RX ORDER — LIDOCAINE HYDROCHLORIDE 20 MG/ML
JELLY TOPICAL ONCE
OUTPATIENT
Start: 2024-08-07 | End: 2024-08-07

## 2024-08-07 RX ORDER — BETAMETHASONE DIPROPIONATE 0.5 MG/G
CREAM TOPICAL ONCE
OUTPATIENT
Start: 2024-08-07 | End: 2024-08-07

## 2024-08-07 RX ORDER — SODIUM CHLOR/HYPOCHLOROUS ACID 0.033 %
SOLUTION, IRRIGATION IRRIGATION ONCE
OUTPATIENT
Start: 2024-08-07 | End: 2024-08-07

## 2024-08-07 RX ORDER — BACITRACIN ZINC AND POLYMYXIN B SULFATE 500; 1000 [USP'U]/G; [USP'U]/G
OINTMENT TOPICAL ONCE
OUTPATIENT
Start: 2024-08-07 | End: 2024-08-07

## 2024-08-07 RX ORDER — TRAMADOL HYDROCHLORIDE 50 MG/1
50 TABLET ORAL EVERY 6 HOURS PRN
Qty: 28 TABLET | Refills: 0 | Status: SHIPPED | OUTPATIENT
Start: 2024-08-07 | End: 2024-08-14

## 2024-08-07 RX ORDER — TRIAMCINOLONE ACETONIDE 1 MG/G
OINTMENT TOPICAL ONCE
OUTPATIENT
Start: 2024-08-07 | End: 2024-08-07

## 2024-08-07 RX ORDER — IBUPROFEN 200 MG
TABLET ORAL ONCE
OUTPATIENT
Start: 2024-08-07 | End: 2024-08-07

## 2024-08-07 RX ORDER — LIDOCAINE HYDROCHLORIDE 40 MG/ML
SOLUTION TOPICAL ONCE
OUTPATIENT
Start: 2024-08-07 | End: 2024-08-07

## 2024-08-07 RX ORDER — LIDOCAINE 40 MG/G
CREAM TOPICAL ONCE
OUTPATIENT
Start: 2024-08-07 | End: 2024-08-07

## 2024-08-07 RX ORDER — GENTAMICIN SULFATE 1 MG/G
OINTMENT TOPICAL ONCE
OUTPATIENT
Start: 2024-08-07 | End: 2024-08-07

## 2024-08-07 ASSESSMENT — PAIN DESCRIPTION - ORIENTATION: ORIENTATION: RIGHT

## 2024-08-07 ASSESSMENT — PAIN SCALES - GENERAL: PAINLEVEL_OUTOF10: 9

## 2024-08-07 ASSESSMENT — PAIN DESCRIPTION - DESCRIPTORS: DESCRIPTORS: ACHING

## 2024-08-07 ASSESSMENT — PAIN DESCRIPTION - LOCATION: LOCATION: TOE (COMMENT WHICH ONE)

## 2024-08-07 NOTE — DISCHARGE INSTRUCTIONS
Return Appointment:   1 week with Yury Barrera DO        Instructions:   Right 2nd Toe:  Cleanse wound with normal saline or VASHE.   Apply Betadine to wound base  No other covering needed.  No need for tubigrip at this time.  Change daily; Home Health to change 2 times a week, wound center to change 1 time a week, and family to change other days.     Patient to offload wound with  open toe shoe  while standing or weight bearing.     MRI scheduled for tomorrow, 8/8/24 at 1:00 PM.  [facility] appointment scheduled for 8/12/24 at 11:15 AM.  Prescription sent to pharmacy for Tramadol due to complaints of night pain.     Do not get wound wet. May purchase cast cover at local pharmacy to keep dry in shower.  Wound healing is greatly slowed when blood glucose levels are greater than 200. Monitor glucose levels daily to ensure tight glucose control.  Follow up with PCP if your glucose levels are frequently greater than 200.  Increase protein intake to promote wound healing. Protein supplements such as Jarocho and Ensure are great options.

## 2024-08-07 NOTE — FLOWSHEET NOTE
08/07/24 1053   Right Leg Edema Point of Measurement   Leg circumference 33 cm   Ankle circumference 22.5 cm   Foot circumference 21.5 cm   Compression Therapy Compression not appropriate   Wound 07/23/24 Toe (Comment  which one) Right #1  2nd Toe   Date First Assessed/Time First Assessed: 07/23/24 1424   Present on Original Admission: Yes  Wound Approximate Age at First Assessment (Weeks): 3 weeks  Primary Wound Type: Diabetic Ulcer  Location: Toe (Comment  which one)  Wound Location Orientation...   Wound Image    Wound Etiology Diabetic Tee 2   Dressing Status Intact   Wound Cleansed Cleansed with saline   Dressing/Treatment Xeroform;Gauze dressing/dressing sponge   Wound Length (cm) 2 cm   Wound Width (cm) 1.5 cm   Wound Depth (cm) 0.1 cm   Wound Surface Area (cm^2) 3 cm^2   Change in Wound Size % (l*w) -33.33   Wound Volume (cm^3) 0.3 cm^3   Wound Healing % -33   Wound Assessment Eschar dry   Drainage Amount None (dry)   Odor None   Wound Thickness Description not for Pressure Injury Full thickness     Patient is currently taking Plavix. Patient refused  at this time.

## 2024-08-07 NOTE — WOUND CARE
Discharge Instructions for  [facility]  [address]  [address]  [place] [address]  Phone [phone]   Fax [phone]      NAME:  Conner Miguel  YOB: 1940  MEDICAL RECORD NUMBER:  [id number]  DATE:  8/7/2024    Return Appointment:   1 week with Yury Barrera DO      Instructions:   Right 2nd Toe:  Cleanse wound with normal saline or VASHE.   Apply Betadine to wound base  No other covering needed.  No need for tubigrip at this time.  Change daily; Home Health to change 2 times a week, wound center to change 1 time a week, and family to change other days.    Patient to offload wound with  open toe shoe  while standing or weight bearing.    MRI scheduled for tomorrow, 8/8/24 at 1:00 PM.  [facility] appointment scheduled for 8/12/24 at 11:15 AM.  Prescription sent to pharmacy for Tramadol due to complaints of night pain.    Do not get wound wet. May purchase cast cover at local pharmacy to keep dry in shower.  Wound healing is greatly slowed when blood glucose levels are greater than 200. Monitor glucose levels daily to ensure tight glucose control.  Follow up with PCP if your glucose levels are frequently greater than 200.  Increase protein intake to promote wound healing. Protein supplements such as Jarocho and Ensure are great options.      Should you experience increased redness, swelling, pain, foul odor, size of wound(s), or have a temperature over 101 degrees please contact the Wound Healing Center at [phone] or if after hours contact your primary care physician or go to the hospital emergency department.    PLEASE NOTE: IF YOU ARE UNABLE TO OBTAIN WOUND SUPPLIES, CONTINUE TO USE THE SUPPLIES YOU HAVE AVAILABLE UNTIL YOU ARE ABLE TO REACH US. IT IS MOST IMPORTANT TO KEEP THE WOUND COVERED AT ALL TIMES.    Electronically signed Sofia Rob PT, WCC on 8/7/2024 at 10:41 AM

## 2024-08-08 ENCOUNTER — HOSPITAL ENCOUNTER (OUTPATIENT)
Age: 84
Discharge: HOME | End: 2024-08-10
Payer: MEDICARE

## 2024-08-08 DIAGNOSIS — L97.515 DIABETIC ULCER OF TOE OF RIGHT FOOT ASSOCIATED WITH TYPE 2 DIABETES MELLITUS, WITH MUSCLE INVOLVEMENT WITHOUT EVIDENCE OF NECROSIS (HCC): ICD-10-CM

## 2024-08-08 DIAGNOSIS — E11.621 DIABETIC ULCER OF TOE OF RIGHT FOOT ASSOCIATED WITH TYPE 2 DIABETES MELLITUS, WITH MUSCLE INVOLVEMENT WITHOUT EVIDENCE OF NECROSIS (HCC): ICD-10-CM

## 2024-08-08 PROCEDURE — 73720 MRI LWR EXTREMITY W/O&W/DYE: CPT

## 2024-08-08 PROCEDURE — A9579 GAD-BASE MR CONTRAST NOS,1ML: HCPCS | Performed by: FAMILY MEDICINE

## 2024-08-08 PROCEDURE — 6360000004 HC RX CONTRAST MEDICATION: Performed by: FAMILY MEDICINE

## 2024-08-08 RX ADMIN — GADOTERIDOL 18 ML: 279.3 INJECTION, SOLUTION INTRAVENOUS at 12:54

## 2024-08-09 ENCOUNTER — TELEPHONE (OUTPATIENT)
Dept: WOUND CARE | Age: 84
End: 2024-08-09

## 2024-08-09 NOTE — TELEPHONE ENCOUNTER
Spoke with home health nurse with [facility]. She requested recertification orders. Notified her that orders have changed and asked her to instruct patient and family regarding dressing orders. Also notified her of plan and future appointments so she can relay to patient and family. She stated she would probably decrease to one time weekly to conserve visits.  
PAST SURGICAL HISTORY:  Kidney stone removal    S/P Knee Surgery right knee arhroscopy